# Patient Record
Sex: FEMALE | Race: OTHER | NOT HISPANIC OR LATINO | URBAN - METROPOLITAN AREA
[De-identification: names, ages, dates, MRNs, and addresses within clinical notes are randomized per-mention and may not be internally consistent; named-entity substitution may affect disease eponyms.]

---

## 2024-03-08 ENCOUNTER — OUTPATIENT (OUTPATIENT)
Dept: OUTPATIENT SERVICES | Facility: HOSPITAL | Age: 49
LOS: 1 days | Discharge: STILL ADMITTED OR ADMINISTRATIVE | End: 2024-03-08

## 2024-04-11 DIAGNOSIS — F30.9 MANIC EPISODE, UNSPECIFIED: ICD-10-CM

## 2024-09-08 ENCOUNTER — EMERGENCY (EMERGENCY)
Facility: HOSPITAL | Age: 49
LOS: 1 days | Discharge: PSYCHIATRIC OR SUBSTANCE USE FACILITY | End: 2024-09-08
Attending: STUDENT IN AN ORGANIZED HEALTH CARE EDUCATION/TRAINING PROGRAM
Payer: SELF-PAY

## 2024-09-08 VITALS
TEMPERATURE: 98 F | OXYGEN SATURATION: 98 % | HEART RATE: 74 BPM | DIASTOLIC BLOOD PRESSURE: 98 MMHG | HEIGHT: 63 IN | RESPIRATION RATE: 16 BRPM | SYSTOLIC BLOOD PRESSURE: 166 MMHG | WEIGHT: 115.08 LBS

## 2024-09-08 LAB
ADD ON TEST-SPECIMEN IN LAB: SIGNIFICANT CHANGE UP
ALBUMIN SERPL ELPH-MCNC: 4.2 G/DL — SIGNIFICANT CHANGE UP (ref 3.3–5)
ALP SERPL-CCNC: 45 U/L — SIGNIFICANT CHANGE UP (ref 40–120)
ALT FLD-CCNC: 8 U/L — LOW (ref 10–45)
ANION GAP SERPL CALC-SCNC: 17 MMOL/L — SIGNIFICANT CHANGE UP (ref 5–17)
AST SERPL-CCNC: 23 U/L — SIGNIFICANT CHANGE UP (ref 10–40)
BASOPHILS # BLD AUTO: 0.01 K/UL — SIGNIFICANT CHANGE UP (ref 0–0.2)
BASOPHILS NFR BLD AUTO: 0.2 % — SIGNIFICANT CHANGE UP (ref 0–2)
BILIRUB SERPL-MCNC: 0.7 MG/DL — SIGNIFICANT CHANGE UP (ref 0.2–1.2)
BUN SERPL-MCNC: 18 MG/DL — SIGNIFICANT CHANGE UP (ref 7–23)
CALCIUM SERPL-MCNC: 9.1 MG/DL — SIGNIFICANT CHANGE UP (ref 8.4–10.5)
CHLORIDE SERPL-SCNC: 103 MMOL/L — SIGNIFICANT CHANGE UP (ref 96–108)
CO2 SERPL-SCNC: 19 MMOL/L — LOW (ref 22–31)
CREAT SERPL-MCNC: 0.54 MG/DL — SIGNIFICANT CHANGE UP (ref 0.5–1.3)
EGFR: 111 ML/MIN/1.73M2 — SIGNIFICANT CHANGE UP
EOSINOPHIL # BLD AUTO: 0.01 K/UL — SIGNIFICANT CHANGE UP (ref 0–0.5)
EOSINOPHIL NFR BLD AUTO: 0.2 % — SIGNIFICANT CHANGE UP (ref 0–6)
ETHANOL SERPL-MCNC: <10 MG/DL — SIGNIFICANT CHANGE UP (ref 0–10)
FLUAV AG NPH QL: SIGNIFICANT CHANGE UP
FLUBV AG NPH QL: SIGNIFICANT CHANGE UP
GLUCOSE SERPL-MCNC: 100 MG/DL — HIGH (ref 70–99)
HCG SERPL-ACNC: <2 MIU/ML — SIGNIFICANT CHANGE UP
HCT VFR BLD CALC: 38.6 % — SIGNIFICANT CHANGE UP (ref 34.5–45)
HGB BLD-MCNC: 12.9 G/DL — SIGNIFICANT CHANGE UP (ref 11.5–15.5)
IMM GRANULOCYTES NFR BLD AUTO: 0.4 % — SIGNIFICANT CHANGE UP (ref 0–0.9)
LYMPHOCYTES # BLD AUTO: 1.03 K/UL — SIGNIFICANT CHANGE UP (ref 1–3.3)
LYMPHOCYTES # BLD AUTO: 21.6 % — SIGNIFICANT CHANGE UP (ref 13–44)
MAGNESIUM SERPL-MCNC: 2.2 MG/DL — SIGNIFICANT CHANGE UP (ref 1.6–2.6)
MCHC RBC-ENTMCNC: 30.6 PG — SIGNIFICANT CHANGE UP (ref 27–34)
MCHC RBC-ENTMCNC: 33.4 GM/DL — SIGNIFICANT CHANGE UP (ref 32–36)
MCV RBC AUTO: 91.7 FL — SIGNIFICANT CHANGE UP (ref 80–100)
MONOCYTES # BLD AUTO: 0.36 K/UL — SIGNIFICANT CHANGE UP (ref 0–0.9)
MONOCYTES NFR BLD AUTO: 7.6 % — SIGNIFICANT CHANGE UP (ref 2–14)
NEUTROPHILS # BLD AUTO: 3.33 K/UL — SIGNIFICANT CHANGE UP (ref 1.8–7.4)
NEUTROPHILS NFR BLD AUTO: 70 % — SIGNIFICANT CHANGE UP (ref 43–77)
NRBC # BLD: 0 /100 WBCS — SIGNIFICANT CHANGE UP (ref 0–0)
PLATELET # BLD AUTO: 163 K/UL — SIGNIFICANT CHANGE UP (ref 150–400)
POTASSIUM SERPL-MCNC: 3.8 MMOL/L — SIGNIFICANT CHANGE UP (ref 3.5–5.3)
POTASSIUM SERPL-SCNC: 3.8 MMOL/L — SIGNIFICANT CHANGE UP (ref 3.5–5.3)
PROT SERPL-MCNC: 7.4 G/DL — SIGNIFICANT CHANGE UP (ref 6–8.3)
RBC # BLD: 4.21 M/UL — SIGNIFICANT CHANGE UP (ref 3.8–5.2)
RBC # FLD: 12.2 % — SIGNIFICANT CHANGE UP (ref 10.3–14.5)
RSV RNA NPH QL NAA+NON-PROBE: SIGNIFICANT CHANGE UP
SALICYLATES SERPL-MCNC: <2 MG/DL — LOW (ref 15–30)
SARS-COV-2 RNA SPEC QL NAA+PROBE: SIGNIFICANT CHANGE UP
SODIUM SERPL-SCNC: 139 MMOL/L — SIGNIFICANT CHANGE UP (ref 135–145)
WBC # BLD: 4.76 K/UL — SIGNIFICANT CHANGE UP (ref 3.8–10.5)
WBC # FLD AUTO: 4.76 K/UL — SIGNIFICANT CHANGE UP (ref 3.8–10.5)

## 2024-09-08 PROCEDURE — 99291 CRITICAL CARE FIRST HOUR: CPT

## 2024-09-08 RX ORDER — OLANZAPINE 7.5 MG/1
10 TABLET ORAL ONCE
Refills: 0 | Status: COMPLETED | OUTPATIENT
Start: 2024-09-08 | End: 2024-09-08

## 2024-09-08 RX ORDER — HALOPERIDOL 1 MG
5 TABLET ORAL ONCE
Refills: 0 | Status: COMPLETED | OUTPATIENT
Start: 2024-09-08 | End: 2024-09-08

## 2024-09-08 RX ADMIN — OLANZAPINE 10 MILLIGRAM(S): 7.5 TABLET ORAL at 14:54

## 2024-09-08 RX ADMIN — Medication 5 MILLIGRAM(S): at 18:26
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
## 2024-09-08 NOTE — ED PROVIDER NOTE - DIFFERENTIAL DIAGNOSIS
ddx includes but not limited to: schizophrenia vs overdose vs electrolyte derangement Differential Diagnosis
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
## 2024-09-08 NOTE — ED ADULT NURSE REASSESSMENT NOTE - DESCRIPTION
Pt took IM medication Haldol 5 mg at 1826H. Pt continued to refuse labs, EKG. Pt refused to answer nursing assessment

## 2024-09-08 NOTE — ED PROVIDER NOTE - ATTENDING CONTRIBUTION TO CARE
I have personally seen the patient with the Resident. I agree with their assessment and plan unless otherwise noted. See MDM I have personally seen the patient with the Resident. I agree with their assessment and plan unless otherwise noted. See MDM    I personally spent 35 mins of critical care time managing this patient excluding procedures, including time spent independently reviewing charts, labs, imaging, discussing with consultants and family members given patient's psychiatric agitation, failure at verbal deescalation, need for IM antipsychotics and monitoring there after
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
## 2024-09-08 NOTE — ED PROVIDER NOTE - ADDITIONAL HISTORY OBTAINED FROM [facility]
Done. Patient notified.    ----- Message from Jeffrey Ontiveros MD sent at 6/25/2018 11:26 AM EDT -----  Contact: PATIENT  The patient may have a Medrol Dosepak to take as directed.  She may have a refill on the Robaxin 750 mg tablets, one 4 times a day #40  ----- Message -----  From: Juliette Clinton MA  Sent: 6/25/2018   9:13 AM  To: Jeffrey Ontiveros MD    Patient wants to know if we will refill the robaxin 750 mg and medrol dose pack she got when she went to [facility] ER 6/18/18 for her back pain? She has an appointment set up to get an MRI this week and to see Dr. Randy Jules with [facility] on 7/3/18.        EMS

## 2024-09-08 NOTE — ED ADULT NURSE REASSESSMENT NOTE - DESCRIPTION
Pt refused second attempt to draw blood for lab, EKG and pulse oximeter. Rn attempted to place portable monitor and pt refused, took off all leads and said he does ot want it. ED ED provider  made aware. Pt  from CC28 to a hospital bed located in [place] [address]

## 2024-09-09 DIAGNOSIS — F29 UNSPECIFIED PSYCHOSIS NOT DUE TO A SUBSTANCE OR KNOWN PHYSIOLOGICAL CONDITION: ICD-10-CM

## 2024-09-09 LAB
AMPHET UR-MCNC: NEGATIVE — SIGNIFICANT CHANGE UP
APPEARANCE UR: CLEAR — SIGNIFICANT CHANGE UP
BACTERIA # UR AUTO: NEGATIVE /HPF — SIGNIFICANT CHANGE UP
BARBITURATES UR SCN-MCNC: NEGATIVE — SIGNIFICANT CHANGE UP
BENZODIAZ UR-MCNC: NEGATIVE — SIGNIFICANT CHANGE UP
BILIRUB UR-MCNC: NEGATIVE — SIGNIFICANT CHANGE UP
CAST: 5 /LPF — HIGH (ref 0–4)
COCAINE METAB.OTHER UR-MCNC: NEGATIVE — SIGNIFICANT CHANGE UP
COLOR SPEC: YELLOW — SIGNIFICANT CHANGE UP
DIFF PNL FLD: ABNORMAL
GLUCOSE UR QL: NEGATIVE MG/DL — SIGNIFICANT CHANGE UP
HCG UR QL: NEGATIVE — SIGNIFICANT CHANGE UP
KETONES UR-MCNC: 80 MG/DL
LEUKOCYTE ESTERASE UR-ACNC: ABNORMAL
METHADONE UR-MCNC: NEGATIVE — SIGNIFICANT CHANGE UP
NITRITE UR-MCNC: NEGATIVE — SIGNIFICANT CHANGE UP
OPIATES UR-MCNC: NEGATIVE — SIGNIFICANT CHANGE UP
OXYCODONE UR-MCNC: NEGATIVE — SIGNIFICANT CHANGE UP
PCP SPEC-MCNC: SIGNIFICANT CHANGE UP
PCP UR-MCNC: NEGATIVE — SIGNIFICANT CHANGE UP
PH UR: 5.5 — SIGNIFICANT CHANGE UP (ref 5–8)
PROT UR-MCNC: SIGNIFICANT CHANGE UP MG/DL
RBC CASTS # UR COMP ASSIST: 339 /HPF — HIGH (ref 0–4)
REVIEW: SIGNIFICANT CHANGE UP
SP GR SPEC: 1.02 — SIGNIFICANT CHANGE UP (ref 1–1.03)
SQUAMOUS # UR AUTO: 4 /HPF — SIGNIFICANT CHANGE UP (ref 0–5)
T3 SERPL-MCNC: 97 NG/DL — SIGNIFICANT CHANGE UP (ref 80–200)
T4 AB SER-ACNC: 8.6 UG/DL — SIGNIFICANT CHANGE UP (ref 4.6–12)
T4 FREE SERPL-MCNC: 1.4 NG/DL — SIGNIFICANT CHANGE UP (ref 0.9–1.8)
THC UR QL: NEGATIVE — SIGNIFICANT CHANGE UP
TSH SERPL-MCNC: 6.27 UIU/ML — HIGH (ref 0.27–4.2)
UROBILINOGEN FLD QL: 0.2 MG/DL — SIGNIFICANT CHANGE UP (ref 0.2–1)
VALPROATE SERPL-MCNC: <5 UG/ML — LOW (ref 50–100)
WBC UR QL: 6 /HPF — HIGH (ref 0–5)

## 2024-09-09 PROCEDURE — 90792 PSYCH DIAG EVAL W/MED SRVCS: CPT | Mod: 95

## 2024-09-09 PROCEDURE — 99223 1ST HOSP IP/OBS HIGH 75: CPT

## 2024-09-09 RX ORDER — OLANZAPINE 7.5 MG/1
2.5 TABLET ORAL ONCE
Refills: 0 | Status: DISCONTINUED | OUTPATIENT
Start: 2024-09-09 | End: 2024-09-09

## 2024-09-09 RX ORDER — OLANZAPINE 7.5 MG/1
2.5 TABLET ORAL ONCE
Refills: 0 | Status: COMPLETED | OUTPATIENT
Start: 2024-09-09 | End: 2024-09-09

## 2024-09-09 RX ORDER — DIVALPROEX SODIUM 125 MG/1
250 CAPSULE, DELAYED RELEASE ORAL
Refills: 0 | Status: ACTIVE | OUTPATIENT
Start: 2024-09-09 | End: 2025-08-08

## 2024-09-09 RX ADMIN — OLANZAPINE 2.5 MILLIGRAM(S): 7.5 TABLET ORAL at 17:00

## 2024-09-09 RX ADMIN — OLANZAPINE 2.5 MILLIGRAM(S): 7.5 TABLET ORAL at 14:18
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
## 2024-09-09 NOTE — ED BEHAVIORAL HEALTH PROGRESS NOTE - PSYCHIATRIC ISSUES AND PLAN (INCLUDE STANDING AND PRN MEDICATION)
re-start depakote 250 mg po bid and may give zyprex 2.5 mg IM Q6hr PRN for acute agitation, f/u QTc < 500
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
## 2024-09-09 NOTE — ED BEHAVIORAL HEALTH ASSESSMENT NOTE - DESCRIPTION
medical clearance unknown, unable to assess due to sedation see HPI, unable to assess due to sedation unknown, unable to assess due to sedation  (elevated TSH 6.27)

## 2024-09-09 NOTE — ED BEHAVIORAL HEALTH PROGRESS NOTE - CASE SUMMARY/FORMULATION (CLEARLY DOCUMENT RATIONALE FOR DISPOSITION CHANGE)
50yo female reassessed after tele-psych and was sedated after PRN Zyprexa IM 10mg 9/8 14:00, haldol 5mg IM  9/8 18:00. On interview patient was uncooperative and constricted. AOx2 to self and location. Mental status exam was attempted and patient refused to participate. Patient answers "I don't know" to every question and couldn't give an explanation for EMS being called despite prompting. She endorses having been hospitalized for psychiatric reasons before, she doesn't know the names of medications she was taking, who her psychiatrist was. When asked to recall medications, she responds that she doesn't want to take any medications at all. Denies past violence. Denies hitting  or anybody, denies HI, and denies SI. Patient reported she lives in apartment with her . She works as an Home Health Aide.    source of collateral: At first she said "I don't remember" the number for . After a few minutes she provided this number [phone] for . The number was disconnected, and two other numbers from  were attempted, one disconnected and one VM left for Dr. Regan at [phone]. Patient is an unreliable historian and has been demonstrating erratic behaviors during ED course. she's demonstrating impulsivity and had one attempt of pacing which was redirected by multiple staff. Pt also began picking her nose, darting eyes around for a place/person to fling. Per staff patient behavior is psychotic and upon waiting for more collateral and possible 2PC to be recommended.

## 2024-09-09 NOTE — ED BEHAVIORAL HEALTH ASSESSMENT NOTE - DETAILS
HOLD for reassessment in am unable to assess due to sedation sedated  info not available per EMS was throwing things at

## 2024-09-09 NOTE — ED BEHAVIORAL HEALTH PROGRESS NOTE - NSBHPSYCHOLCOGORIENT_PSY_A_CORE
unable to assess due to sedation/Oriented to time, place, person, situation unable to assess due to sedation/Not fully oriented...

## 2024-09-09 NOTE — ED ADULT NURSE REASSESSMENT NOTE - NS ED NURSE REASSESS COMMENT FT1
pt refused depakote despite encouragement. Stated pt is hungry but when RN provided sandwich pt stated "I don't want it". 1:1 maintained for safety

## 2024-09-09 NOTE — ED BEHAVIORAL HEALTH PROGRESS NOTE - NSBHMSEGAIT_PSY_A_CORE
unable to assess due to sedation/Unable to assess unable to assess due to sedation/Normal gait / station

## 2024-09-09 NOTE — ED CDU PROVIDER INITIAL DAY NOTE - CONSTITUTIONAL, MLM
normal... Well appearing, awake, alert, oriented to person, place, time/situation and periodically agitated.
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
## 2024-09-09 NOTE — ED BEHAVIORAL HEALTH ASSESSMENT NOTE - SUBSTANCE USE
"[facility] [[id number]]  Chief Complaint   Patient presents with     RECHECK     Follow up     Initial /69   Pulse 87   Ht 4' 10.03\" (147.4 cm)   Wt 80 lb 7.5 oz (36.5 kg)   BMI 16.80 kg/m   Estimated body mass index is 16.8 kg/m  as calculated from the following:    Height as of this encounter: 4' 10.03\" (147.4 cm).    Weight as of this encounter: 80 lb 7.5 oz (36.5 kg).  Medication Reconciliation: complete    Does the patient need any medication refills today? No    Does the patient/parent need MyChart or Proxy acces today? No     Kelly Martinez, EMT          "
None known

## 2024-09-09 NOTE — ED CDU PROVIDER INITIAL DAY NOTE - CLINICAL SUMMARY MEDICAL DECISION MAKING FREE TEXT BOX
50 yo F with schizophrenia p/w agitated behavior, uncooperative with psych. Mandarin speaking.   Plan for 2 PC admission, psychiatry:    depakote 250 mg po bid and may give zyprex 2.5 mg IM Q6hr PRN for acute agitation, f/u QTc < 500    - Pending psych bed placement
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
## 2024-09-09 NOTE — ED BEHAVIORAL HEALTH ASSESSMENT NOTE - OTHER PAST PSYCHIATRIC HISTORY (INCLUDE DETAILS REGARDING ONSET, COURSE OF ILLNESS, INPATIENT/OUTPATIENT TREATMENT)
Per Naila, h/o schizophrenia, last seen at [facility] 33/8/24, inpt at [facility] 2/24, [facility] 3/21

## 2024-09-09 NOTE — ED BEHAVIORAL HEALTH PROGRESS NOTE - NS_RISKASSESSMENTINTER_PSY_ALL_CORE
Attempted to returned patient phone call, no answer left detailed message with my name and call back number [phone]      Verónica Phoenix MA   Dermatology Department  [facility]    ----- Message from Minoo Landin sent at 3/23/2022  2:56 PM CDT -----  Type:  Sooner Apoointment Request    Caller is requesting a sooner appointment.  Caller declined first available appointment listed below.  Caller will not accept being placed on the waitlist and is requesting a message be sent to doctor.  Name of Caller:patient  When is the first available appointment?na  Symptoms:Np,hair loss  Would the patient rather a call back or a response via [facility]? Call back  Best Call Back Number:[phone]  Additional Information: no appt available          Unable to determine Suicide Risk
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
## 2024-09-09 NOTE — ED ADULT NURSE REASSESSMENT NOTE - NS ED NURSE REASSESS COMMENT FT1
Pt attempting to make self vomit, pt became aggressive towards RN during intervention. MD consulted and Zyprexa 2.5mg administered IM STAT for psychotic agitation

## 2024-09-09 NOTE — ED ADULT NURSE REASSESSMENT NOTE - NS ED NURSE REASSESS COMMENT FT1
Pt woke up screaming "I want to leave". Pt redirected, pt clothes and bedsheet stained with menstrual blood. After much encouragement, pt changed into adult briefs and sheets changed, pt refused hygiene care of perineal area. 1:1 maintained for safety

## 2024-09-10 LAB
CULTURE RESULTS: SIGNIFICANT CHANGE UP
SPECIMEN SOURCE: SIGNIFICANT CHANGE UP

## 2024-09-10 PROCEDURE — 99232 SBSQ HOSP IP/OBS MODERATE 35: CPT

## 2024-09-10 RX ORDER — OLANZAPINE 7.5 MG/1
10 TABLET ORAL ONCE
Refills: 0 | Status: COMPLETED | OUTPATIENT
Start: 2024-09-10 | End: 2024-09-10

## 2024-09-10 RX ORDER — LORAZEPAM 4 MG/ML
1 INJECTION INTRAMUSCULAR; INTRAVENOUS ONCE
Refills: 0 | Status: DISCONTINUED | OUTPATIENT
Start: 2024-09-10 | End: 2024-09-10

## 2024-09-10 RX ORDER — OLANZAPINE 7.5 MG/1
2.5 TABLET ORAL EVERY 6 HOURS
Refills: 0 | Status: ACTIVE | OUTPATIENT
Start: 2024-09-10 | End: 2025-08-09

## 2024-09-10 RX ADMIN — OLANZAPINE 10 MILLIGRAM(S): 7.5 TABLET ORAL at 09:36

## 2024-09-10 RX ADMIN — LORAZEPAM 1 MILLIGRAM(S): 4 INJECTION INTRAMUSCULAR; INTRAVENOUS at 09:38

## 2024-09-10 RX ADMIN — DIVALPROEX SODIUM 250 MILLIGRAM(S): 125 CAPSULE, DELAYED RELEASE ORAL at 09:44

## 2024-09-10 NOTE — ED CDU PROVIDER SUBSEQUENT DAY NOTE - CLINICAL SUMMARY MEDICAL DECISION MAKING FREE TEXT BOX
Attending Dr. Witt:   48 yo F with history of schizophrenia noncompliant with Depakote presenting with acute psychosis.  recommending admission pending bed availability.    Meds: Depakote 250 mg PO BID and Olanzapine 2.5 mg IM q6 PRN Agitation  QTc 9/8 445
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
## 2024-09-10 NOTE — ED BEHAVIORAL HEALTH PROGRESS NOTE - NSBHMSETHTPROC_PSY_A_CORE
unable to assess due to sedation/Disorganized/Unable to assess unable to assess due to sedation/Disorganized/Illogical/Unable to assess
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
## 2024-09-10 NOTE — ED BEHAVIORAL HEALTH PROGRESS NOTE - PSYCHIATRIC ISSUES AND PLAN (INCLUDE STANDING AND PRN MEDICATION)
c/w Depakote 250 mg po bid  may give PRN Zydis 5 mg PO q4hr PRN for acute agitation, f/u QTc < 500  Ativan 2mg [id number] PO - if IV/IM must wait 3 hours after Zyprexa IM.   c/w Depakote 250 mg po bid  may give PRN Zydis 5 mg PO q4hr PRN for acute agitation, f/u QTc < 500  Ativan 2mg [id number] - if IV/IM must hold until 3 hours after Zyprexa IM admin   c/w Depakote 250 mg po bid, can start zyprexa 5mg po qhs   may give PRN Zydis 5 mg PO q4hr PRN for acute agitation, f/u QTc < 500  Ativan 2mg [id number] - if IV/IM must hold until 3 hours after Zyprexa IM admin

## 2024-09-10 NOTE — ED BEHAVIORAL HEALTH PROGRESS NOTE - COLLATERAL INFORMATION (NAME, PHONE, RELATIONSHIP):
PCA stated patient became agitated and was sedated. PCA stated patient became agitated and was sedated.  pt not answering most questions. pt recived prns last night

## 2024-09-10 NOTE — ED ADULT NURSE REASSESSMENT NOTE - NS ED NURSE REASSESS COMMENT FT1
1:1 at bedside for pt risk to harm self and safety. Pt agitated, yelling and screaming. ED MD Edwards made aware, Pt to be medicated emergently. Pt has [employer]  Cannot be seen here per lc

## 2024-09-11 ENCOUNTER — INPATIENT (INPATIENT)
Facility: HOSPITAL | Age: 49
LOS: 8 days | Discharge: HOME | End: 2024-09-20
Attending: PSYCHIATRY & NEUROLOGY | Admitting: PSYCHIATRY & NEUROLOGY
Payer: COMMERCIAL

## 2024-09-11 VITALS — HEIGHT: 58.86 IN | OXYGEN SATURATION: 98 % | WEIGHT: 106.04 LBS | TEMPERATURE: 98 F | RESPIRATION RATE: 16 BRPM

## 2024-09-11 VITALS
SYSTOLIC BLOOD PRESSURE: 138 MMHG | DIASTOLIC BLOOD PRESSURE: 88 MMHG | TEMPERATURE: 98 F | HEART RATE: 67 BPM | OXYGEN SATURATION: 100 % | RESPIRATION RATE: 16 BRPM

## 2024-09-11 DIAGNOSIS — F20.9 SCHIZOPHRENIA, UNSPECIFIED: ICD-10-CM

## 2024-09-11 PROCEDURE — 80053 COMPREHEN METABOLIC PANEL: CPT

## 2024-09-11 PROCEDURE — 96372 THER/PROPH/DIAG INJ SC/IM: CPT

## 2024-09-11 PROCEDURE — 84480 ASSAY TRIIODOTHYRONINE (T3): CPT

## 2024-09-11 PROCEDURE — 80299 QUANTITATIVE ASSAY DRUG: CPT

## 2024-09-11 PROCEDURE — 81001 URINALYSIS AUTO W/SCOPE: CPT

## 2024-09-11 PROCEDURE — 81025 URINE PREGNANCY TEST: CPT

## 2024-09-11 PROCEDURE — 87637 SARSCOV2&INF A&B&RSV AMP PRB: CPT

## 2024-09-11 PROCEDURE — 85025 COMPLETE CBC W/AUTO DIFF WBC: CPT

## 2024-09-11 PROCEDURE — 84702 CHORIONIC GONADOTROPIN TEST: CPT

## 2024-09-11 PROCEDURE — G0378: CPT

## 2024-09-11 PROCEDURE — 84443 ASSAY THYROID STIM HORMONE: CPT

## 2024-09-11 PROCEDURE — 80307 DRUG TEST PRSMV CHEM ANLYZR: CPT

## 2024-09-11 PROCEDURE — 99285 EMERGENCY DEPT VISIT HI MDM: CPT | Mod: 25

## 2024-09-11 PROCEDURE — 99233 SBSQ HOSP IP/OBS HIGH 50: CPT

## 2024-09-11 PROCEDURE — 87086 URINE CULTURE/COLONY COUNT: CPT

## 2024-09-11 PROCEDURE — 80164 ASSAY DIPROPYLACETIC ACD TOT: CPT

## 2024-09-11 PROCEDURE — 84436 ASSAY OF TOTAL THYROXINE: CPT

## 2024-09-11 PROCEDURE — 84439 ASSAY OF FREE THYROXINE: CPT

## 2024-09-11 PROCEDURE — 96374 THER/PROPH/DIAG INJ IV PUSH: CPT

## 2024-09-11 PROCEDURE — 36415 COLL VENOUS BLD VENIPUNCTURE: CPT

## 2024-09-11 PROCEDURE — 83735 ASSAY OF MAGNESIUM: CPT

## 2024-09-11 PROCEDURE — 93005 ELECTROCARDIOGRAM TRACING: CPT

## 2024-09-11 RX ORDER — LORAZEPAM 4 MG/ML
2 INJECTION INTRAMUSCULAR; INTRAVENOUS ONCE
Refills: 0 | Status: DISCONTINUED | OUTPATIENT
Start: 2024-09-11 | End: 2024-09-18

## 2024-09-11 RX ORDER — OLANZAPINE 7.5 MG/1
5 TABLET ORAL AT BEDTIME
Refills: 0 | Status: DISCONTINUED | OUTPATIENT
Start: 2024-09-11 | End: 2024-09-12

## 2024-09-11 RX ORDER — OLANZAPINE 7.5 MG/1
2.5 TABLET ORAL ONCE
Refills: 0 | Status: COMPLETED | OUTPATIENT
Start: 2024-09-11 | End: 2024-09-11

## 2024-09-11 RX ORDER — LORAZEPAM 4 MG/ML
2 INJECTION INTRAMUSCULAR; INTRAVENOUS EVERY 4 HOURS
Refills: 0 | Status: DISCONTINUED | OUTPATIENT
Start: 2024-09-11 | End: 2024-09-18

## 2024-09-11 RX ORDER — DIVALPROEX SODIUM 125 MG/1
250 CAPSULE, DELAYED RELEASE ORAL
Refills: 0 | Status: DISCONTINUED | OUTPATIENT
Start: 2024-09-11 | End: 2024-09-12

## 2024-09-11 RX ORDER — OLANZAPINE 7.5 MG/1
5 TABLET ORAL ONCE
Refills: 0 | Status: DISCONTINUED | OUTPATIENT
Start: 2024-09-11 | End: 2024-09-20

## 2024-09-11 RX ORDER — OLANZAPINE 7.5 MG/1
5 TABLET ORAL EVERY 6 HOURS
Refills: 0 | Status: DISCONTINUED | OUTPATIENT
Start: 2024-09-11 | End: 2024-09-12

## 2024-09-11 RX ORDER — DIPHENHYDRAMINE HCL 50 MG
50 CAPSULE ORAL EVERY 6 HOURS
Refills: 0 | Status: DISCONTINUED | OUTPATIENT
Start: 2024-09-11 | End: 2024-09-20

## 2024-09-11 RX ADMIN — OLANZAPINE 2.5 MILLIGRAM(S): 7.5 TABLET ORAL at 15:27

## 2024-09-11 NOTE — BH INPATIENT PSYCHIATRY ASSESSMENT NOTE - PAST PSYCHOTROPIC MEDICATION
Zyprexa, Nortriptyline, Ambien, Abilify - unable to obtain further information about doses, duration, effects

## 2024-09-11 NOTE — ED BEHAVIORAL HEALTH PROGRESS NOTE - BILLING CODES
[id number] diagnostic evaluation with medical services
Billing in another system
Billing in another system

## 2024-09-11 NOTE — BH INPATIENT PSYCHIATRY ASSESSMENT NOTE - RISK ASSESSMENT
Risk factors: h/o psych admissions, noncompliant with treatment, not receiving treatment    Protective factors: no current SIIP/HIIP, no h/o SA/SIB, no access to weapons, no active substance abuse, good physical health, domiciled, intact marriage    Overall, pt is a moderate risk of harm to self/others and requires/meets criteria for psychiatric admission.

## 2024-09-11 NOTE — ED ADULT NURSE REASSESSMENT NOTE - DESCRIPTION
Pt refused am meds, depakote. t said she is not sick and has been talking loud in bed. Pt returned items given to her to use for writing and went back to bed

## 2024-09-11 NOTE — CHART NOTE - NSCHARTNOTEFT_GEN_A_CORE
Screening Medical Evaluation    Patient Admitted from: [facility] ED    [facility] admitting diagnosis: Schizophrenia      PAST MEDICAL & SURGICAL HISTORY:  No pertinent past medical history      No significant past surgical history            Allergies    Allergy Status Unknown    Intolerances          Social History:       FAMILY HISTORY:        MEDICATIONS  (STANDING):  divalproex  milliGRAM(s) Oral two times a day  OLANZapine 5 milliGRAM(s) Oral at bedtime    MEDICATIONS  (PRN):  diphenhydrAMINE Injectable 50 milliGRAM(s) IntraMuscular every 6 hours PRN Agitation  LORazepam     Tablet 2 milliGRAM(s) Oral every 4 hours PRN agitation  LORazepam   Injectable 2 milliGRAM(s) IntraMuscular once PRN agitation  OLANZapine 5 milliGRAM(s) Oral every 6 hours PRN agitation  OLANZapine Injectable 5 milliGRAM(s) IntraMuscular once PRN agitation        Vital Signs Last 24 Hrs  T(C): 36.8 (11 Sep 2024 17:45), Max: 36.8 (11 Sep 2024 17:45)  T(F): 98.3 (11 Sep 2024 17:45), Max: 98.3 (11 Sep 2024 17:45)  HR: -- 96b/ min   BP: -- 141/ 88  BP(mean): --  RR: 16 (11 Sep 2024 17:45) (16 - 16)  SpO2: 98% (11 Sep 2024 17:45) (98% - 98%)    Parameters below as of 11 Sep 2024 17:45  Patient On (Oxygen Delivery Method): room air      CAPILLARY BLOOD GLUCOSE            PHYSICAL EXAM:  GENERAL: NAD  HEAD:  Atraumatic, Normocephalic  EYES: EOMI, PERRLA, conjunctiva and sclera clear  NECK: Supple, No JVD  CHEST/LUNG: Clear to auscultation bilaterally; No wheeze  HEART: Regular rate and rhythm; No murmurs, rubs, or gallops  ABDOMEN: Positive BS, Soft, Nontender.   EXTREMITIES:  2+ Peripheral Pulses, No clubbing, cyanosis, or edema  PSYCH: Preoccupied with desire to be discharged.   NEUROLOGY: non-focal  SKIN: No rashes or lesions seen on exposed skin.     LABS:                    RADIOLOGY & ADDITIONAL TESTS:      Assessment and Plan:  49F admitted to [facility] for Schizophrenia.  No PMHx.  Pt seen for medical screening evaluation. Patient has no acute complaints at this time. Patient denies fever, chills, headache, dizziness, lightheadedness, N/V, SOB, cough, congestion, chest pain, abdominal pain, dysuria, hematuria, diarrhea, constipation. Physical exam unremarkable, VSS. Labs pending. 9/8 EKG NSR @ 65/ min QT/ QTC= 428/ 455.     1.) Schizophrenia: Plan per primary team.

## 2024-09-11 NOTE — ED BEHAVIORAL HEALTH PROGRESS NOTE - AXIS III
unknown (elevated TSH on labs today)

## 2024-09-11 NOTE — ED BEHAVIORAL HEALTH PROGRESS NOTE - NSBHMSEPERCEPT_PSY_A_CORE
unable to assess due to sedation/Auditory hallucinations/Visual hallucinations Auditory hallucinations/Visual hallucinations

## 2024-09-11 NOTE — ED ADULT NURSE REASSESSMENT NOTE - GENERAL PATIENT STATE
anxious
pt resting comfortably in stretcher/comfortable appearance
comfortable appearance/cooperative
comfortable appearance/cooperative
comfortable appearance
resting/sleeping

## 2024-09-11 NOTE — ED ADULT NURSE REASSESSMENT NOTE - STATUS
Pt 2 PC awaiting bed
Pt 2 PC awaiting d/c
awaiting bed, no change
[facility] Low 6/awaiting transfer, no change
awaiting transfer, no change
pt 2PC awaiting d/c
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
## 2024-09-11 NOTE — ED ADULT NURSE REASSESSMENT NOTE - DESCRIPTION
at 1527H pt received Zyprexa 2.5 mg IM for a cute agitation. Pt has been very loud, evry anxious, protesting her transfer to [facility]. Pt refused to listed to staff when the process of admission was being explained. Pt argued with Rn and was making menacing gestures saying that she is not going to transfer to [facility]

## 2024-09-11 NOTE — BH INPATIENT PSYCHIATRY ASSESSMENT NOTE - NSBHCHARTREVIEWVS_PSY_A_CORE FT
Vital Signs Last 24 Hrs  T(C): 36.8 (09-11-24 @ 17:45), Max: 36.8 (09-11-24 @ 17:45)  T(F): 98.3 (09-11-24 @ 17:45), Max: 98.3 (09-11-24 @ 17:45)  HR: --  BP: --  BP(mean): --  RR: 16 (09-11-24 @ 17:45) (16 - 16)  SpO2: 98% (09-11-24 @ 17:45) (98% - 98%)    Orthostatic VS  09-11-24 @ 17:45  Lying BP: --/-- HR: --  Sitting BP: 141/88 HR: 96  Standing BP: 135/89 HR: 101  Site: --  Mode: --

## 2024-09-11 NOTE — ED CDU PROVIDER SUBSEQUENT DAY NOTE - HISTORY
Attending Dr. Witt:   see ED provider note.
Attending Dr. Witt:   49F schizophrenia presenting with agitation at home, Noncompliant with meds  Meds: Depakote 250 mg PO BID  PRNs: Zyprexa 2.5 mg IM q6

## 2024-09-11 NOTE — ED CDU PROVIDER SUBSEQUENT DAY NOTE - PHYSICAL EXAMINATION
CONSTITUTIONAL: NAD  SKIN: Warm dry, normal skin turgor  HEAD: NCAT  NECK: Supple. Full ROM.  CARD: RRR  RESP: No respiratory distress  ABD: non-distended  MSK: Full ROM, no leg swelling  PSYCH: Calm, resting
CONSTITUTIONAL: NAD  SKIN: Warm dry, normal skin turgor  HEAD: NCAT  EYES: EOMI, PERRLA, no scleral icterus, conjunctiva pink  NECK: Supple; non tender. Full ROM.  CARD: RRR  RESP: No respiratory distress  ABD: non-distended  MSK: Full ROM, no leg swelling  PSYCH: Calm, disorganized.

## 2024-09-11 NOTE — BH INPATIENT PSYCHIATRY ASSESSMENT NOTE - CURRENT MEDICATION
MEDICATIONS  (STANDING):    MEDICATIONS  (PRN):   MEDICATIONS  (STANDING):  divalproex  milliGRAM(s) Oral two times a day  OLANZapine 5 milliGRAM(s) Oral at bedtime    MEDICATIONS  (PRN):  diphenhydrAMINE Injectable 50 milliGRAM(s) IntraMuscular every 6 hours PRN Agitation  LORazepam     Tablet 2 milliGRAM(s) Oral every 4 hours PRN agitation  LORazepam   Injectable 2 milliGRAM(s) IntraMuscular once PRN agitation  OLANZapine 5 milliGRAM(s) Oral every 6 hours PRN agitation  OLANZapine Injectable 5 milliGRAM(s) IntraMuscular once PRN agitation

## 2024-09-11 NOTE — BH INPATIENT PSYCHIATRY ASSESSMENT NOTE - NSBHMETABOLIC_PSY_ALL_CORE_FT
BMI: BMI (kg/m2): 21.5 (09-11-24 @ 17:45)  HbA1c:   Glucose:   BP: --Vital Signs Last 24 Hrs  T(C): 36.8 (09-11-24 @ 17:45), Max: 36.8 (09-11-24 @ 17:45)  T(F): 98.3 (09-11-24 @ 17:45), Max: 98.3 (09-11-24 @ 17:45)  HR: --  BP: --  BP(mean): --  RR: 16 (09-11-24 @ 17:45) (16 - 16)  SpO2: 98% (09-11-24 @ 17:45) (98% - 98%)    Orthostatic VS  09-11-24 @ 17:45  Lying BP: --/-- HR: --  Sitting BP: 141/88 HR: 96  Standing BP: 135/89 HR: 101  Site: --  Mode: --    Lipid Panel:
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
## 2024-09-11 NOTE — ED BEHAVIORAL HEALTH PROGRESS NOTE - NSBHMSESPEECH_PSY_A_CORE
unable to assess due to sedation/Normal volume, rate, productivity, spontaneity and articulation Normal volume, rate, productivity, spontaneity and articulation Abnormal as indicated, otherwise normal...

## 2024-09-11 NOTE — ED BEHAVIORAL HEALTH PROGRESS NOTE - SUMMARY
48 yo female, Mandrin speaking, unknown PMH, PPH of schizophrenia per Psyckes with several admissions (last one listed at [facility] 2/24), recently taking Depakote per Psyckes BIB EMS after reportedly becoming agitated aggressive at home with  (Contact info for collateral info not available).   Per EMS report, pt was throwing things at .       When evaluated in ED, pt refused to answer questions, and then began licking underwear and menstruation pad.   Was able to be redirected to put on gown and was then tearful and crying uncontrollably.   ED felt pt was unsafe and gave Zyprexa 10mg im . pt being admitted to psych  50 yo female, Mandrin speaking, unknown PMH, PPH of schizophrenia per Psyckes with several admissions (last one listed at [facility] 2/24), recently taking Depakote per Psyckes BIB EMS after reportedly becoming agitated aggressive at home with  (Contact info for collateral info not available).   Per EMS report, pt was throwing things at .       When evaluated in ED, pt refused to answer questions, and then began licking underwear and menstruation pad.   Was able to be redirected to put on gown and was then tearful and crying uncontrollably. ED felt pt was unsafe and gave Zyprexa 10mg im . pt being admitted to psych

## 2024-09-11 NOTE — ED BEHAVIORAL HEALTH PROGRESS NOTE - NS ED BHA PLAN ADMIT TO PSYCHIATRY BH CONTACT YN
[facility] calls to report patient has lower than normal blood pressure compared to his baseline. Patient baseline is 130 to 140 over 60 to 70.   His recent lowet was 91/55 and another reading was 115/60.   Bonnie reports patient is taking enalapril and recently there has been an adjustment to his meds.    Family is asking for a call to discuss this and any possible med adjustment.      Best number to call back [phone]     
No

## 2024-09-11 NOTE — BH INPATIENT PSYCHIATRY ASSESSMENT NOTE - NSBHATTESTTYPEVISIT_PSY_A_CORE
pt due Manual PD drain after 4 hour dwell BP: 85/59 HR - 89 pt filled with 2.5L of 1.5% Dianeal @ 2100. Concern about BP dropping when draining pt Attending with Resident/Fellow/Student

## 2024-09-11 NOTE — BH PATIENT PROFILE - NSDYSPHAGSECTONE_PSY_ALL_CORE
Daniel Barahona MD    Coordinator Contact:   Armando Devries, Research Coordinator Associate  Phone: [phone]  Pager:  [phone]  Email: [email or web address]        
N/A

## 2024-09-11 NOTE — ED BEHAVIORAL HEALTH PROGRESS NOTE - RISK ASSESSMENT
risk: h/o psychosis, report of lability, disorganized behavior in ED  protective: , domiciled
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
## 2024-09-11 NOTE — ED ADULT NURSE REASSESSMENT NOTE - COMFORT CARE
plan of care explained
plan of care explained/wait time explained
meal provided/plan of care explained/wait time explained
plan of care explained/wait time explained

## 2024-09-11 NOTE — BH INPATIENT PSYCHIATRY ASSESSMENT NOTE - OTHER PAST PSYCHIATRIC HISTORY (INCLUDE DETAILS REGARDING ONSET, COURSE OF ILLNESS, INPATIENT/OUTPATIENT TREATMENT)
Per Jaspal, h/o schizophrenia, last seen at [facility] 33/8/24, inpt at [facility] 2/24, [facility] 3/21

## 2024-09-11 NOTE — BH INPATIENT PSYCHIATRY ASSESSMENT NOTE - NSBHATTESTTYPESTAFF_PSY_A_CORE
[facility]  Discharge Summary    Date of Service: 07/10/2024  Patient Name: Lopez Mcdaniel  : 1945  MRN: [id number]    Date of Admission: 2024  Discharge Diagnosis:   Near syncope    Date of Discharge: 07/10/2024  Primary Care Physician: Josey Stovall APRN      Presenting Problem:   Dehydration [E86.0]  Near syncope [R55]  Multiple falls [R29.6]    Active and Resolved Hospital Problems:  Active Hospital Problems    Diagnosis POA    **Near syncope [R55] Yes    Recurrent falls [R29.6] Not Applicable    Unsteady gait [R26.81] Unknown      Resolved Hospital Problems   No resolved problems to display.         Hospital Course       Hospital Course:  This patient is an 78-year-old gentleman who was admitted status post a near syncopal episode.  Patient had recurrent falls.  A CT scan of the head showed no acute intracranial process.  Cardiac enzymes were mildly elevated but flat trend with no complaints of chest pain or any other symptoms of acute coronary syndrome.  Orthostatic vital signs were checked and were unremarkable.  An echocardiogram was done.  This showed an ejection fraction of 66 to 70% with no wall motion abnormalities and no cardiac explanation for near syncopal episodes.  He was seen by physical therapy and occupational therapy and recommendations were for outpatient physical therapy 2-3 times a week with home health due to impaired functional mobility.  Home health has been set up by case management.  Patient is being discharged home today.        DISCHARGE Follow Up Recommendations for labs and diagnostics: Follow-up with PCP in 1 week.      Reasons For Change In Medications and Indications for New Medications:      Day of Discharge     Vital Signs:  Temp:  [97.2 °F (36.2 °C)-98 °F (36.7 °C)] 97.9 °F (36.6 °C)  Heart Rate:  [56-65] 65  Resp:  [13-17] 17  BP: ()/(55-63) 101/58    Physical Exam:  Physical Exam  Constitutional:       Appearance: Normal  appearance.   HENT:      Head: Normocephalic and atraumatic.      Nose: Nose normal.      Mouth/Throat:      Mouth: Mucous membranes are moist.   Eyes:      Extraocular Movements: Extraocular movements intact.      Conjunctiva/sclera: Conjunctivae normal.      Pupils: Pupils are equal, round, and reactive to light.   Cardiovascular:      Rate and Rhythm: Normal rate and regular rhythm.      Pulses: Normal pulses.      Heart sounds: Normal heart sounds.   Pulmonary:      Effort: Pulmonary effort is normal.      Breath sounds: Normal breath sounds.   Abdominal:      General: Abdomen is flat. Bowel sounds are normal.      Palpations: Abdomen is soft.   Musculoskeletal:         General: Normal range of motion.      Cervical back: Normal range of motion and neck supple.   Skin:     General: Skin is warm and dry.      Capillary Refill: Capillary refill takes less than 2 seconds.   Neurological:      Mental Status: He is alert.           Pertinent  and/or Most Recent Results     LAB RESULTS:      Lab 07/08/24  1251   WBC 5.59   HEMOGLOBIN 13.2   HEMATOCRIT 41.9   PLATELETS 157   NEUTROS ABS 3.75   IMMATURE GRANS (ABS) 0.02   LYMPHS ABS 1.03   MONOS ABS 0.45   EOS ABS 0.30   MCV 91.1         Lab 07/09/24  1629 07/08/24  1251   SODIUM 142 140   POTASSIUM 4.3 3.3*   CHLORIDE 108* 106   CO2 24.1 24.5   ANION GAP 9.9 9.5   BUN 14 22   CREATININE 0.97 1.29*   EGFR 78.9 56.1*   GLUCOSE 79 93   CALCIUM 8.2* 8.7   MAGNESIUM  --  2.1         Lab 07/08/24  1251   TOTAL PROTEIN 6.6   ALBUMIN 3.4*   GLOBULIN 3.2   ALT (SGPT) 23   AST (SGOT) 33   BILIRUBIN 1.0   ALK PHOS 89         Lab 07/08/24  1429 07/08/24  1251   HSTROP T 80* 86*                 Brief Urine Lab Results       None          Microbiology Results (last 10 days)       ** No results found for the last 240 hours. **            XR Pelvis 1 or 2 View    Result Date: 7/9/2024  Impression: Impression: 1. No visible fracture. 2. Incidentally noted advanced lower lumbar  degenerative disc disease. Electronically Signed: Miles Hess MD  7/9/2024 7:58 AM EDT  Workstation ID: [id number]    XR Chest 1 View    Result Date: 7/9/2024  Impression: Impression: 1.No acute cardiopulmonary abnormality. 2.Large hiatal hernia. Electronically Signed: Guanako Gonzalez MD  7/9/2024 5:54 AM EDT  Workstation ID: [id number]    CT Head Without Contrast    Result Date: 7/9/2024  Impression: Impression: 1.No acute intracranial abnormality. 2.Mild chronic small vessel ischemic change. Electronically Signed: Guanako Gonzalez MD  7/9/2024 5:12 AM EDT  Workstation ID: [id number]    CT Cervical Spine Without Contrast    Result Date: 7/9/2024  Impression: Impression: 1.No acute osseous abnormality. 2.Severe cervical spondylosis with multilevel spondylolisthesis and varying degrees of neuroforaminal narrowing. 3.Moderate bilateral carotid bifurcation calcification. Electronically Signed: Guanako Gonzalez MD  7/9/2024 5:11 AM EDT  Workstation ID: [id number]     Results for orders placed during the hospital encounter of 07/27/23    Doppler Ankle Brachial Index Single Level CAR    Interpretation Summary    Right Conclusion: The right RAMOS is unable to be assessed due to vessel incompressibility but waveforms are normal.  Normal digital pressures.    Left Conclusion: The left RAMOS is unable to be assessed due to vessel incompressibility but waveforms are normal.  Normal digital pressures.      Results for orders placed during the hospital encounter of 07/27/23    Doppler Ankle Brachial Index Single Level CAR    Interpretation Summary    Right Conclusion: The right RAMOS is unable to be assessed due to vessel incompressibility but waveforms are normal.  Normal digital pressures.    Left Conclusion: The left RAMOS is unable to be assessed due to vessel incompressibility but waveforms are normal.  Normal digital pressures.      Results for orders placed during the hospital encounter of 07/08/24    Adult Transthoracic Echo Complete W/ Cont  if Necessary Per Protocol    Interpretation Summary    Left ventricular systolic function is normal. Calculated left ventricular EF = 68% Left ventricular ejection fraction appears to be 66 - 70%.    Left ventricular diastolic function is consistent with (grade I) impaired relaxation.    The left atrial cavity is mildly dilated.    Estimated right ventricular systolic pressure from tricuspid regurgitation is mildly elevated (35-45 mmHg).      Labs Pending at Discharge:      Procedures Performed           Consults:   Consults       No orders found from 6/9/2024 to 7/9/2024.              Discharge Details        Discharge Medications      Patient Not Prescribed Medications Upon Discharge         Allergies   Allergen Reactions    Sulfa Antibiotics Nausea Only    Latex Rash    Wound Dressing Adhesive Rash         Discharge Disposition: Discharge home  Home or Self Care    Diet:  Hospital:  Diet Order   Procedures    Diet: Cardiac, Diabetic; Healthy Heart (2-3 Na+); Consistent Carbohydrate; Fluid Consistency: Thin (IDDSI 0)         Discharge Activity:   Activity Instructions       Activity as Tolerated                CODE STATUS:  There are no questions and answers to display.         Future Appointments   Date Time Provider Department Center   7/12/2024 10:45 AM [facility] WOUND CARE ROOM 3 [facility] None       Additional Instructions for the Follow-ups that You Need to Schedule       Ambulatory Referral to Home Health   As directed      Face to Face Visit Date: 7/10/2024   Follow-up provider for Plan of Care?: I treated the patient in an acute care facility and will not continue treatment after discharge.   Follow-up provider: JEAN BARAJAS [[id number]]   Reason/Clinical Findings: Home health physical therapy for continued PT.  Evaluated by physical therapy in the hospital setting and this has been recommended   Describe mobility limitations that make leaving home difficult: Unable to ascend stairs safely    Nursing/Therapeutic Services Requested: Physical Therapy Occupational Therapy   Frequency: 1 Week 1        Discharge Follow-up with PCP   As directed       Currently Documented PCP:    Josey Stovall APRN    PCP Phone Number:    [phone]     Follow Up Details: 1 week                Time spent on Discharge including face to face service:  >30 minutes    Signature: Electronically signed by Lori Manrique MD, 07/10/24, 17:00 EDT.  [facility] Hospitalist Team    Resident

## 2024-09-11 NOTE — ED BEHAVIORAL HEALTH PROGRESS NOTE - UNABLE TO CARE FOR SELF DETAILS
paranoid, non-compliant with medications
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 Uriarte on 10/24/23) who presented to the ED for AMS.  Patient currently AAOx2, unable to provide further history.  Unsure why she is in the hospital.  Per ED, patient was found to be confused over the past three days.  Also notes fatigue and worsening sundowning.  Patient currently has no active complaints.  Vitals stable, labs show elevated creatinine.  Will admit to med surg.  
paranoid, non-compliant with medications
paranoid, non-compliant with medications

## 2024-09-11 NOTE — ED BEHAVIORAL HEALTH PROGRESS NOTE - PSYCHIATRIC ISSUES AND PLAN (INCLUDE STANDING AND PRN MEDICATION)
c/w Depakote 250 mg po bid, can start zyprexa 5mg po qhs   may give PRN Zydis 5 mg PO q4hr PRN for acute agitation, f/u QTc < 500  Ativan 2mg k9ipude - if IV/IM must hold until 3 hours after Zyprexa IM admin

## 2024-09-11 NOTE — BH INPATIENT PSYCHIATRY ASSESSMENT NOTE - NSBHATTESTCOMMENTATTENDFT_PSY_A_CORE
50 yo female, Mandrin speaking, unknown PMH, PPH of schizophrenia per Psyckes with several admissions (last one listed at [facility] 2/24), recently taking Depakote per Psyckes BIB EMS after reportedly becoming agitated aggressive at home with  (Contact info for collateral info not available).   Per EMS report, pt was throwing things at .  Pt was seen in the [facility] ED, and deemed to require psychiatric admission per C/L. Pt was disorganized, in thoughts and behaviors- was licking menstrual blood of maxi pad; pt was unable to provide correct phone number for collateral. Here on the unit, patient presents as calm and cooperative, she is still expressing some paranoid delusions and endorsing AVH. Given history of prior psych admissions, disorganized behavior in ED that required IM medications, and agitation at home (i.e., throwing things at ), patient's presentation is consistent with psychosis and requires psychiatric admission for stabilization.
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
## 2024-09-11 NOTE — ED ADULT NURSE REASSESSMENT NOTE - DESCRIPTION
Pt is portesting her admission to psychiatry and wants to go home, Pt has episodes of agitation but de escalates w/o incident Pt is protesting her admission to psychiatry and wants to go home, Pt has episodes of agitation but de escalates w/o incident

## 2024-09-12 LAB
CHOLEST SERPL-MCNC: 204 MG/DL — HIGH
HDLC SERPL-MCNC: 61 MG/DL — SIGNIFICANT CHANGE UP
LIPID PNL WITH DIRECT LDL SERPL: 130 MG/DL — HIGH
NON HDL CHOLESTEROL: 143 MG/DL — HIGH
T3 SERPL-MCNC: 122 NG/DL — SIGNIFICANT CHANGE UP (ref 80–200)
T4 AB SER-ACNC: 8.64 UG/DL — SIGNIFICANT CHANGE UP (ref 5.1–13)
TRIGL SERPL-MCNC: 64 MG/DL — SIGNIFICANT CHANGE UP
TSH SERPL-MCNC: 2.27 UIU/ML — SIGNIFICANT CHANGE UP (ref 0.27–4.2)

## 2024-09-12 PROCEDURE — 99233 SBSQ HOSP IP/OBS HIGH 50: CPT | Mod: GC

## 2024-09-12 RX ORDER — PALIPERIDONE 3 MG/1
1.5 TABLET, EXTENDED RELEASE ORAL AT BEDTIME
Refills: 0 | Status: DISCONTINUED | OUTPATIENT
Start: 2024-09-12 | End: 2024-09-13

## 2024-09-12 RX ORDER — OLANZAPINE 7.5 MG/1
5 TABLET ORAL EVERY 4 HOURS
Refills: 0 | Status: DISCONTINUED | OUTPATIENT
Start: 2024-09-12 | End: 2024-09-20

## 2024-09-12 RX ORDER — TRAZODONE HCL 50 MG
50 TABLET ORAL AT BEDTIME
Refills: 0 | Status: DISCONTINUED | OUTPATIENT
Start: 2024-09-12 | End: 2024-09-20

## 2024-09-12 RX ADMIN — PALIPERIDONE 1.5 MILLIGRAM(S): 3 TABLET, EXTENDED RELEASE ORAL at 20:27

## 2024-09-12 RX ADMIN — LORAZEPAM 2 MILLIGRAM(S): 4 INJECTION INTRAMUSCULAR; INTRAVENOUS at 20:27

## 2024-09-12 NOTE — BH INPATIENT PSYCHIATRY PROGRESS NOTE - NSBHATTESTCOMMENTATTENDFT_PSY_A_CORE
50 yo female, Mandrin speaking, unknown PMH, PPH of schizophrenia per Psyckes with several admissions (last one listed at [facility] 2/24), recently taking Depakote per Psyckes BIB EMS after reportedly becoming agitated aggressive at home with  (Contact info for collateral info not available).   Per EMS report, pt was throwing things at .  Pt was seen in the [facility] ED, and deemed to require psychiatric admission per C/L. Pt was disorganized, in thoughts and behaviors- was licking menstrual blood of maxi pad; pt was unable to provide correct phone number for collateral. Here on the unit, patient presents as calm and cooperative, she is still expressing some paranoid delusions and endorsing AVH. Given history of prior psych admissions, disorganized behavior in ED that required IM medications, and agitation at home (i.e., throwing things at ), patient's presentation is consistent with psychosis and requires psychiatric admission for stabilization.

## 2024-09-12 NOTE — PSYCHIATRIC REHAB INITIAL EVALUATION - NSBHPRRECOMMEND_PSY_ALL_CORE
Writer met with patient in order to introduce patient to Psychiatric Rehabilitation Staff, department functions, orient patient to ML6 unit programming and to establish a Psychiatric Rehabilitation goal. Patient was elevated, disorganized, illogical and dressed appropriately during session. Patient had a difficult time identifying an appropriate goal with writer. Writer was able to give patient an appropriate goal located in their  plan of care. Psychiatric Rehabilitation Staff will continue to engage patient daily in order to develop rapport, provide support and to assist patient in demonstrating progress towards Psychiatric Rehabilitation.

## 2024-09-12 NOTE — BH SOCIAL WORK INITIAL PSYCHOSOCIAL EVALUATION - OTHER PAST PSYCHIATRIC HISTORY (INCLUDE DETAILS REGARDING ONSET, COURSE OF ILLNESS, INPATIENT/OUTPATIENT TREATMENT)
**PT WAS ADMITTED TO ED AT [facility] ON 24 UNDER MR # [id number].  Portion of psychosocial was copied from documentation while pt was in ED**    50 yo female, mostly Mandrin speaking but can also speak English, unknown PMH, PPH of schizophrenia (per Jaspal, last seen at [facility] 3/8/24, inpt at [facility] , [facility] 3/21. Recently taking Depakote) BIB EMS after reportedly becoming agitated aggressive at home with  (Contact info for collateral info not available). Per EMS report, pt was throwing things at .       When initially evaluated in ED, pt refused to answer questions, and then began licking underwear and menstruation pad.   Was able to be redirected to put on gown and was then tearful and crying uncontrollably. ED felt pt was unsafe and gave Zyprexa 10mg im x1.    during course of ED stay below was recorded:  AOx2 to self and location. Mental status exam was attempted and patient refused to participate. Patient answers "I don't know" to every question and couldn't give an explanation for EMS being called despite prompting. She endorses having been hospitalized for psychiatric reasons before, she doesn't know the names of medications she was taking, who her psychiatrist was. When asked to recall medications, she responds that she doesn't want to take any medications at all. Denies past violence. Denies hitting  or anybody, denies HI, and denies SI. Patient reported she lives in apartment with her . She works as an Home Health Aide.    Regarding collateral:  Pt first said "I don't remember" the number for . After a few minutes she provided this number [phone] for . The number was disconnected, and two other numbers from  were attempted, one disconnected and one VM left for Dr. Nunez at [phone]. Patient is an unreliable historian and has been demonstrating erratic behaviors during ED course. she's demonstrating impulsivity and had one attempt of pacing which was redirected by multiple staff. Pt also began picking her nose, darting eyes around for a place/person to fling.  1) [phone] pt reported for  - disconnected  2) [phone] from [employer] - "number you've dialed is not answering. please try again later"  3) [phone] from [facility] - Dr. Nunez - ED worker left a voicemail    Patient confirms visual hallucinations of devils, which she was dismissive of that being a problem. she feels healed by Bahai figure, 2 years ago, and that she was sleeping better and no longer has neck deformity, all better now. she said she does hear things that other people don't hear, she said she can ignore them and they don't tell her to hurt people or herself. Denies command hallucinations. Denies anxiety/paranoia. Denies all depression/jazz symptoms.  Denies SIIP/HIIP. She's refusing meds because she feels healed by a Bahai figure, 2 years ago, and that she was sleeping better and no longer has neck deformity, all better now so she doesn't need meds and is upset that they sedated her IM multiple times. Patient has poor insight into her behaviors.   Her family members are in [place], her parents are  and she has 3 brothers and two sisters. Patient states that her education level was some high school. She was able to correctly divide 100 by 7=14. Patient was assessed to be AOx2, disoriented to situation and time. Despite prompting, she was unable to recall the events that transpired prior to her admittance, but is able to recall when she received IM Zyprexa    While on Low 6:  pt was calm and cooperative, linear, focused on discharge, endorsing AVH. Pt states that she does not know why she is here. She feels "calm" and wants to go home. She denies any disorganized or agitated behavior while she was in the ED. She denies any past psychiatric history and states she does not take any medications at home. She denies any medical issues at this time. She does endorse some AVH, states she sees devils and demons and her  twin sister, but she is not afraid of them because is a [employer] and Abraham will protect her. When asked about CAH, she states that she would not listen to any of them, but declines to answer if the voices have commanded her to do anything. She denies SIIP and HIIP.    This writer will attempt to obtain collateral once pt is clearer and is less disorganized.

## 2024-09-13 RX ORDER — PALIPERIDONE 3 MG/1
3 TABLET, EXTENDED RELEASE ORAL AT BEDTIME
Refills: 0 | Status: DISCONTINUED | OUTPATIENT
Start: 2024-09-13 | End: 2024-09-16

## 2024-09-13 RX ADMIN — PALIPERIDONE 3 MILLIGRAM(S): 3 TABLET, EXTENDED RELEASE ORAL at 20:33

## 2024-09-13 RX ADMIN — LORAZEPAM 2 MILLIGRAM(S): 4 INJECTION INTRAMUSCULAR; INTRAVENOUS at 20:33

## 2024-09-13 NOTE — BH INPATIENT PSYCHIATRY PROGRESS NOTE - OTHER
flora ayala ?very mildly blunted re carrying child of huyen Rosarioy DC focused pt narrative "down after I hear the voices" but I am good now; we can make the voices go away? [seems surprised by this] mildly restless at times, improving

## 2024-09-14 PROCEDURE — 99232 SBSQ HOSP IP/OBS MODERATE 35: CPT

## 2024-09-14 RX ADMIN — PALIPERIDONE 3 MILLIGRAM(S): 3 TABLET, EXTENDED RELEASE ORAL at 20:26

## 2024-09-15 PROCEDURE — 99232 SBSQ HOSP IP/OBS MODERATE 35: CPT

## 2024-09-15 RX ADMIN — PALIPERIDONE 3 MILLIGRAM(S): 3 TABLET, EXTENDED RELEASE ORAL at 21:18

## 2024-09-16 PROCEDURE — 99232 SBSQ HOSP IP/OBS MODERATE 35: CPT

## 2024-09-16 RX ORDER — PALIPERIDONE 3 MG/1
4.5 TABLET, EXTENDED RELEASE ORAL AT BEDTIME
Refills: 0 | Status: DISCONTINUED | OUTPATIENT
Start: 2024-09-16 | End: 2024-09-18

## 2024-09-16 RX ADMIN — PALIPERIDONE 4.5 MILLIGRAM(S): 3 TABLET, EXTENDED RELEASE ORAL at 20:26

## 2024-09-16 NOTE — BH INPATIENT PSYCHIATRY PROGRESS NOTE - OTHER
Engages with MD via phone  re lucy, attenuating all attenuating re carrying child of Abraham, huyeny DC focused, attenuating "down after I hear the voices" but I am good now; we can make the voices go away? [seems surprised by this];  mildly elevated pt narrative ?very mildly blunted, now less constricted

## 2024-09-17 PROCEDURE — 99231 SBSQ HOSP IP/OBS SF/LOW 25: CPT

## 2024-09-17 RX ADMIN — PALIPERIDONE 4.5 MILLIGRAM(S): 3 TABLET, EXTENDED RELEASE ORAL at 20:06

## 2024-09-17 NOTE — BH INPATIENT PSYCHIATRY PROGRESS NOTE - OTHER
?very mildly blunted, now less constricted pt narrative re carrying child of Abraham, huyeny DC focused, attenuating Engages with MD via phone  "down after I hear the voices" but I am good now; we can make the voices go away? [seems surprised by this];  mildly elevated, attenuating re lucy, attenuating all attenuating

## 2024-09-18 PROCEDURE — 99233 SBSQ HOSP IP/OBS HIGH 50: CPT

## 2024-09-18 RX ORDER — PALIPERIDONE 3 MG/1
6 TABLET, EXTENDED RELEASE ORAL AT BEDTIME
Refills: 0 | Status: DISCONTINUED | OUTPATIENT
Start: 2024-09-18 | End: 2024-09-20

## 2024-09-18 RX ORDER — LORAZEPAM 4 MG/ML
1 INJECTION INTRAMUSCULAR; INTRAVENOUS EVERY 4 HOURS
Refills: 0 | Status: DISCONTINUED | OUTPATIENT
Start: 2024-09-18 | End: 2024-09-20

## 2024-09-18 RX ORDER — LORAZEPAM 4 MG/ML
2 INJECTION INTRAMUSCULAR; INTRAVENOUS ONCE
Refills: 0 | Status: DISCONTINUED | OUTPATIENT
Start: 2024-09-18 | End: 2024-09-20

## 2024-09-18 RX ADMIN — PALIPERIDONE 6 MILLIGRAM(S): 3 TABLET, EXTENDED RELEASE ORAL at 21:02

## 2024-09-18 NOTE — BH INPATIENT PSYCHIATRY PROGRESS NOTE - OTHER
re lucy, attenuating and largely resolved Engages with MD via phone  ?very mildly blunted, now less constricted pt narrative "down after I hear the voices" but I am good now; we can make the voices go away? [seems surprised by this];  mildly elevated, attenuating all attenuating re carrying child of Abraham, huyeny DC focused, now resolved

## 2024-09-18 NOTE — BH INPATIENT PSYCHIATRY PROGRESS NOTE - NSBHTIMEACTIVITIESPERFORMED_PSY_A_CORE
family meeting with  in mandarin via phone  case review in AM with full team  dispo planning
case review with team  interview in mandarin  psychoed re med options

## 2024-09-19 PROCEDURE — 99232 SBSQ HOSP IP/OBS MODERATE 35: CPT

## 2024-09-19 RX ORDER — PALIPERIDONE 3 MG/1
1 TABLET, EXTENDED RELEASE ORAL
Qty: 30 | Refills: 0
Start: 2024-09-19 | End: 2024-10-18

## 2024-09-19 RX ADMIN — PALIPERIDONE 6 MILLIGRAM(S): 3 TABLET, EXTENDED RELEASE ORAL at 20:26

## 2024-09-19 NOTE — BH INPATIENT PSYCHIATRY PROGRESS NOTE - PRN MEDS
MEDICATIONS  (PRN):  diphenhydrAMINE Injectable 50 milliGRAM(s) IntraMuscular every 6 hours PRN Agitation  LORazepam     Tablet 2 milliGRAM(s) Oral every 4 hours PRN agitation  LORazepam   Injectable 2 milliGRAM(s) IntraMuscular once PRN agitation  OLANZapine 5 milliGRAM(s) Oral every 4 hours PRN agitation  OLANZapine Injectable 5 milliGRAM(s) IntraMuscular once PRN agitation  traZODone 50 milliGRAM(s) Oral at bedtime PRN insomnia  
MEDICATIONS  (PRN):  diphenhydrAMINE Injectable 50 milliGRAM(s) IntraMuscular every 6 hours PRN Agitation  LORazepam     Tablet 1 milliGRAM(s) Oral every 4 hours PRN agitation  LORazepam   Injectable 2 milliGRAM(s) IntraMuscular once PRN agitation  OLANZapine 5 milliGRAM(s) Oral every 4 hours PRN agitation  OLANZapine Injectable 5 milliGRAM(s) IntraMuscular once PRN agitation  traZODone 50 milliGRAM(s) Oral at bedtime PRN insomnia  
MEDICATIONS  (PRN):  diphenhydrAMINE Injectable 50 milliGRAM(s) IntraMuscular every 6 hours PRN Agitation  LORazepam     Tablet 2 milliGRAM(s) Oral every 4 hours PRN agitation  LORazepam   Injectable 2 milliGRAM(s) IntraMuscular once PRN agitation  OLANZapine 5 milliGRAM(s) Oral every 4 hours PRN agitation  OLANZapine Injectable 5 milliGRAM(s) IntraMuscular once PRN agitation  traZODone 50 milliGRAM(s) Oral at bedtime PRN insomnia  
MEDICATIONS  (PRN):  diphenhydrAMINE Injectable 50 milliGRAM(s) IntraMuscular every 6 hours PRN Agitation  LORazepam     Tablet 2 milliGRAM(s) Oral every 4 hours PRN agitation  LORazepam   Injectable 2 milliGRAM(s) IntraMuscular once PRN agitation  OLANZapine 5 milliGRAM(s) Oral every 4 hours PRN agitation  OLANZapine Injectable 5 milliGRAM(s) IntraMuscular once PRN agitation  traZODone 50 milliGRAM(s) Oral at bedtime PRN insomnia  
MEDICATIONS  (PRN):  diphenhydrAMINE Injectable 50 milliGRAM(s) IntraMuscular every 6 hours PRN Agitation  LORazepam     Tablet 1 milliGRAM(s) Oral every 4 hours PRN agitation  LORazepam   Injectable 2 milliGRAM(s) IntraMuscular once PRN agitation  OLANZapine 5 milliGRAM(s) Oral every 4 hours PRN agitation  OLANZapine Injectable 5 milliGRAM(s) IntraMuscular once PRN agitation  traZODone 50 milliGRAM(s) Oral at bedtime PRN insomnia  
MEDICATIONS  (PRN):  diphenhydrAMINE Injectable 50 milliGRAM(s) IntraMuscular every 6 hours PRN Agitation  LORazepam     Tablet 2 milliGRAM(s) Oral every 4 hours PRN agitation  LORazepam   Injectable 2 milliGRAM(s) IntraMuscular once PRN agitation  OLANZapine 5 milliGRAM(s) Oral every 4 hours PRN agitation  OLANZapine Injectable 5 milliGRAM(s) IntraMuscular once PRN agitation  traZODone 50 milliGRAM(s) Oral at bedtime PRN insomnia  

## 2024-09-19 NOTE — BH INPATIENT PSYCHIATRY PROGRESS NOTE - NSBHATTESTTYPEVISIT_PSY_A_CORE
ALBERT without on-site Attending supervision
Attending Only
Attending Only
ALBERT without on-site Attending supervision
Attending Only
Attending with Resident/Fellow/Student

## 2024-09-19 NOTE — BH INPATIENT PSYCHIATRY PROGRESS NOTE - NSBHMSEPERCEPT_PSY_A_CORE
Auditory hallucinations
Auditory hallucinations/Visual hallucinations/Other
Auditory hallucinations/Other
Auditory hallucinations/Visual hallucinations/Other/Unable to assess
Auditory hallucinations/Visual hallucinations/Other
Auditory hallucinations/Other

## 2024-09-19 NOTE — BH INPATIENT PSYCHIATRY PROGRESS NOTE - NSBHFUPINTERVALHXFT_PSY_A_CORE
Michelle GUSMAN report received in case reviewed with full team in a.m., patient seen and MSE done. We had another lengthy session today in Mandarin with phone  Angeli [phone]. Initially patient was overly discharged focused as she was yesterday but we reviewed symptoms and patient is very clear that she is experiencing distress from auditory hallucinations and would in fact like a strategy to make these voices go away when she hears that this is possible and realistic with medication , that MD suggested. We continued to build on prior discussion and even discussed plan for a family meeting next week and will increase medication this weekend for greater affect. Patient is pleased and adequately related unless impatient. No new side effects reported or observed.
Michelle GUSMAN report received and case reviewed at team meeting in detail, patient scene and MSE done. ED note duly noted and appreciated, however, limitEd content as Zyprexa 10 mg Im prn medication was needed. Mandarin  Katya number [id number] was utilized for interpretation. However patient is bilingual but given detailed questioning and limits in vocabulary Patient and MD opted to conduct interview in Mandarin. Patient was able to endorse auditory hallucinations that have persisted over 10 years and visual hallucinations that have persisted for 20 years and patient describes content of AHH as having to do with angels that interfere with her life as they come from hell and attempt to lure her with a lot of talking“. She states that she is Khang‘s wife and says the voices are neither positive nor negative but she does not trust them. She states she does not recollect throwing items at her . She does feel after she she hears voices that her mood goes down, but is a limited historian and then clarifies first can visual hallucinations and then auditory hallucinations and subsequently the drop in mood but she’s not able to clarify, if her daily says frequently. MD asked patient about her unusual behavior and emergency room of licking her underwear and menstrual pad and says that she is pregnant now with the Lord baby and menstruation is coming and she licked the menstrual pad because it is the Lord baby, but then, additionally she says she doubts whether it is real so inside appears partial and limited but patient also not in favor of medication and MD explained to quiet all of these issues. Patient says she obtains help from her  and that is the answer. She says sleep is OK unless the angels interfere. No effort or wishes for self harm identified hence no CO warranted at this time. A time she laughs because she thinks her  brought her here because she is crazy and MD explained craziness is not a psychiatric term and also [facility] is not a state hospital. So we discussed psychosis as a symptom manifested in numerous illnesses, including mood disorders and schizophrenia. MD suggested again medication could help patient and she appears willing to consider this. No new side effects reported or observed.
Michelle rendon in report received and case reviewed with team in a.m., patient seen and MSE done. No acute events overnight and yesterday patient agreed to an increase in INVEGA. She knows that AH are attenuating. She also agrees to her family meeting with her  this week, possibly with goal of discharge as soon as Friday if team can ascertain safety at home. Social work and rn and md asking for accurate phone number, so it has to reach  to invite him to family meeting this week, but date phone numbers is given by patient are not correct. Improvements and relatedness and appears calm with adequate behavioral control. No side effects reported or observed.  
Michelle GUSMAN report received and case reviewed with full team in a.m., patient seen and MSE done. We used mandarin  Adel [phone] mandarin  number and  FABIÁN li join conversation also in Mandarin. We reviewed case and medication and symptoms and issues regarding stress at job and  indicated patient has two major mood episodes a year and MD explained how medication such as INVEGA will help with this. Patient offered an option for MARTINEZ but patient and  think pill is better. we made plan for Friday discharge if mandarin can be set up in time as patient currently does not have a provider that speaks Mandarin in [facility] and will need such. All questions answered and improvements noted and patient will trial invega 6 mg nightly for two nights. Patient can then decide on her optimal dose. No new side effects reported or observed.  
Pt compliant with medication and tolerating it well.  Chart reviewed, no events reported overnight.  Pt cooperative with interview, but declines Mandarin .  Pt speaks English to writer.  Pt denies SI/HI/I/P.  Pt reports AH and reports she knows the bible.  Pt reports she did not sleep last night because she is thinking too much.  Pt denies VH.  Pt eating well.
UZMA Martinez report received and case reviewed with team in a.m., patient seen and MSE done. No acute events over weekend and in fact, patient shows improvement and brighter affect emerging and tolerates discussion with MD via  phone and  Karey/Noble very well. She has good report with her MD and updating her MD about her improvements and is more stable and auditory hallucinations are attenuating on trial of inVega. She agrees that increase in medication may help further attenuate, auditory, hallucinations and increase to plan possibly also family meeting with  this week with discharge by Friday. No new side effects reported or observed.
Pt compliant with medication and tolerating it well.  Chart reviewed, no events reported overnight.  Pt in bed sleeping and refuses to get out of bed to go to  phone.  Pt appears in no apparent distress.
Michelle GUSMAN report received in case reviewed in team meeting in a.m., patient seen and ELDER Venkat. Mandarin  Mona SATISH is personal identifier helped with interpretation in mandarin via phone. Patient shows much improvement and thinks that she likes the higher dose of 6 mg more than 4.5 mg of I NVEGA. Consequently we will offer that dose at discharge tomorrow assuming no side effects or other issues. Patient very pleased with her progress and says mood improved and no SI of any sort and also AH largely resolved, but continues to have the feeling that angels are around her at times. MD cannot ascertain. This is entirely psychotic as it appears to impart the cultural/Restoration belief. No new side effects reported or observed.

## 2024-09-19 NOTE — BH INPATIENT PSYCHIATRY PROGRESS NOTE - NSBHMSETHTPROC_PSY_A_CORE
Disorganized/Perseverative/Illogical/Impaired reasoning
Linear/Circumstantial/Impaired reasoning
Linear/Circumstantial/Impaired reasoning
Linear/Circumstantial
Linear
Linear/Circumstantial
Impaired reasoning/Unable to assess
Linear/Circumstantial

## 2024-09-19 NOTE — BH INPATIENT PSYCHIATRY DISCHARGE NOTE - NSBHFUPINTERVALHXFT_PSY_A_CORE
Discharge Progress Note Date and Time: 09-20-24 @ 12:02    Patient seen via Mandarin phone  ID#[id number]. Patient found sitting in group. She reports she is looking forward to going home today, getting back to work, being with her , and reading her bible. She denies AH, stating they went away, though does report VH of seeing "some angels." Reports feeling safe, denies paranoid thinking. Denies SI, HI. Does report some intermittent head pressure she thinks is related to medication, denies visual changes, and overall feels it is manageable. Encouraged to see how progresses and discuss with OP doctor if continues, and to present to ER if acutely worsens. Denies muscle stiffness, restlessness. Reminded that she can come to the ER or call 911 at any time if she feels unsafe.

## 2024-09-19 NOTE — BH INPATIENT PSYCHIATRY PROGRESS NOTE - ADDITIONAL DETAILS / COMMENTS
explains AH and VH as Gnosticist experience
explains AH and VH as Roman Catholic experience, agrees to PO meds, both now improving
explains AH and VH as Nondenominational experience
explains AH and VH as Latter day experience  both I&J slightly improved, less perseverative re DC, agrees to trial of medication
explains AH and VH as Muslim experience
explains AH and VH as Taoism experience  both I&J slightly improved, less perseverative re DC, agrees to trial of medication
explains AH and VH as Gnosticist experience  both I&J slightly improved, less perseverative re DC, agrees to trial of medication
explains AH and VH as Nondenominational experience  both I&J slightly improved, less perseverative re DC, agrees to trial of medication

## 2024-09-19 NOTE — BH INPATIENT PSYCHIATRY PROGRESS NOTE - NSBHMSEMOVE_PSY_A_CORE
Unable to assess
Unable to assess
No abnormal movements
Unable to assess
No abnormal movements
No abnormal movements

## 2024-09-19 NOTE — BH INPATIENT PSYCHIATRY PROGRESS NOTE - NSBHMSEMUSCLE_PSY_A_CORE
Unable to assess
Unable to assess
Normal muscle tone/strength
Unable to assess

## 2024-09-19 NOTE — BH TREATMENT PLAN - NSTXSLPPATINTERMD_PSY_ALL_CORE
psychopharm with possible MARTINEZ and supportive therapy with aftercare referral
psychopharm with possible MARTINEZ and supportive therapy with aftercare referral

## 2024-09-19 NOTE — BH INPATIENT PSYCHIATRY PROGRESS NOTE - NSBHMSEAFFQUAL_PSY_A_CORE
Depressed/Anxious
Euthymic/Anxious/Other
Elevated/Anxious
Depressed/Anxious
Elevated/Anxious/Other
Depressed/Anxious
Elevated/Anxious/Other
Elevated/Anxious/Other

## 2024-09-19 NOTE — BH INPATIENT PSYCHIATRY PROGRESS NOTE - NSTXMEDICGOAL_PSY_ALL_CORE
Take all medications as prescribed
Be able to describe the benefit of medication/treatment
Take all medications as prescribed
Take all medications as prescribed
Be able to describe the benefit of medication/treatment
Take all medications as prescribed

## 2024-09-19 NOTE — BH INPATIENT PSYCHIATRY PROGRESS NOTE - NSBHCHARTREVIEWVS_PSY_A_CORE FT
Vital Signs Last 24 Hrs  T(C): 36.6 (09-15-24 @ 08:12), Max: 36.6 (09-15-24 @ 08:12)  T(F): 97.9 (09-15-24 @ 08:12), Max: 97.9 (09-15-24 @ 08:12)  HR: --  BP: 136/88 (09-15-24 @ 08:12) (136/88 - 136/88)  BP(mean): 80 (09-15-24 @ 08:12) (80 - 80)  RR: --  SpO2: --    Orthostatic VS  09-14-24 @ 07:39  Lying BP: --/-- HR: --  Sitting BP: 130/80 HR: 78  Standing BP: 109/74 HR: 108  Site: --  Mode: --  
Vital Signs Last 24 Hrs  T(C): 36.9 (09-17-24 @ 07:44), Max: 36.9 (09-17-24 @ 07:44)  T(F): 98.5 (09-17-24 @ 07:44), Max: 98.5 (09-17-24 @ 07:44)  HR: --  BP: --  BP(mean): --  RR: --  SpO2: --    Orthostatic VS  09-17-24 @ 07:44  Lying BP: --/-- HR: --  Sitting BP: 125/75 HR: 85  Standing BP: 135/65 HR: 80  Site: --  Mode: --  
Vital Signs Last 24 Hrs  T(C): 36.3 (09-16-24 @ 08:05), Max: 36.3 (09-16-24 @ 08:05)  T(F): 97.3 (09-16-24 @ 08:05), Max: 97.3 (09-16-24 @ 08:05)  HR: 60 (09-16-24 @ 08:05) (60 - 60)  BP: 144/84 (09-16-24 @ 08:05) (144/84 - 144/84)  BP(mean): --  RR: --  SpO2: --    
Vital Signs Last 24 Hrs  T(C): 36.8 (09-14-24 @ 07:39), Max: 36.8 (09-14-24 @ 07:39)  T(F): 98.2 (09-14-24 @ 07:39), Max: 98.2 (09-14-24 @ 07:39)  HR: --  BP: --  BP(mean): --  RR: --  SpO2: --    Orthostatic VS  09-14-24 @ 07:39  Lying BP: --/-- HR: --  Sitting BP: 130/80 HR: 78  Standing BP: 109/74 HR: 108  Site: --  Mode: --  Orthostatic VS  09-13-24 @ 06:37  Lying BP: --/-- HR: --  Sitting BP: 128/82 HR: 85  Standing BP: 125/83 HR: 94  Site: --  Mode: --  
Vital Signs Last 24 Hrs  T(C): 36.3 (09-19-24 @ 07:25), Max: 36.3 (09-19-24 @ 07:25)  T(F): 97.3 (09-19-24 @ 07:25), Max: 97.3 (09-19-24 @ 07:25)  HR: --  BP: --  BP(mean): --  RR: --  SpO2: --    Orthostatic VS  09-19-24 @ 07:25  Lying BP: --/-- HR: --  Sitting BP: 103/66 HR: 72  Standing BP: --/-- HR: --  Site: --  Mode: --  
Vital Signs Last 24 Hrs  T(C): 36.7 (09-18-24 @ 07:51), Max: 36.7 (09-18-24 @ 07:51)  T(F): 98 (09-18-24 @ 07:51), Max: 98 (09-18-24 @ 07:51)  HR: 85 (09-18-24 @ 07:51) (85 - 85)  BP: 111/68 (09-18-24 @ 07:51) (111/68 - 111/68)  BP(mean): --  RR: --  SpO2: --    Orthostatic VS  09-17-24 @ 07:44  Lying BP: --/-- HR: --  Sitting BP: 125/75 HR: 85  Standing BP: 135/65 HR: 80  Site: --  Mode: --  
Vital Signs Last 24 Hrs  T(C): 36.7 (09-12-24 @ 07:35), Max: 36.7 (09-12-24 @ 07:35)  T(F): 98.1 (09-12-24 @ 07:35), Max: 98.1 (09-12-24 @ 07:35)  HR: --  BP: 122/81 (09-12-24 @ 07:35) (122/81 - 122/81)  BP(mean): 68 (09-12-24 @ 07:35) (68 - 68)  RR: --  SpO2: --    Orthostatic VS  09-11-24 @ 17:45  Lying BP: --/-- HR: --  Sitting BP: 141/88 HR: 96  Standing BP: 135/89 HR: 101  Site: --  Mode: --  
Vital Signs Last 24 Hrs  T(C): 36.8 (09-14-24 @ 07:39), Max: 36.8 (09-14-24 @ 07:39)  T(F): 98.2 (09-14-24 @ 07:39), Max: 98.2 (09-14-24 @ 07:39)  HR: --  BP: --  BP(mean): --  RR: --  SpO2: --    Orthostatic VS  09-14-24 @ 07:39  Lying BP: --/-- HR: --  Sitting BP: 130/80 HR: 78  Standing BP: 109/74 HR: 108  Site: --  Mode: --  Orthostatic VS  09-13-24 @ 06:37  Lying BP: --/-- HR: --  Sitting BP: 128/82 HR: 85  Standing BP: 125/83 HR: 94  Site: --  Mode: --

## 2024-09-19 NOTE — BH DISCHARGE NOTE NURSING/SOCIAL WORK/PSYCH REHAB - PATIENT PORTAL LINK FT
You can access the FollowMyHealth Patient Portal offered by [facility] by registering at the following website: [email or web address]. By joining [facility]’s FollowMyHealth portal, you will also be able to view your health information using other applications (apps) compatible with our system.

## 2024-09-19 NOTE — BH DISCHARGE NOTE NURSING/SOCIAL WORK/PSYCH REHAB - NSBHDCADDR1FT_A_CORE
[address],   [place] [address] This appt will be virtual, and they will call you at the time of your appointment

## 2024-09-19 NOTE — BH DISCHARGE NOTE NURSING/SOCIAL WORK/PSYCH REHAB - NSCDUDCCRISIS_PSY_A_CORE
[facility]  1 [phone]) [facility] ([phone])  Text "WELL" to 29354  Website: [email or web address].Moneybook2u.Com/.National Suicide Prevention Lifeline 1 (723) 426-3341/.  [employer]  1 (892) LIF[employer] (049-4955)/.  [facility]  [address] [place] [id number] [phone]   Hours:  Monday through Friday from 9 AM to 3 PM

## 2024-09-19 NOTE — BH INPATIENT PSYCHIATRY PROGRESS NOTE - NSTXANXGOAL_PSY_ALL_CORE
Be able to participate in activities despite lingering anxiety/panic

## 2024-09-19 NOTE — BH INPATIENT PSYCHIATRY PROGRESS NOTE - NSDCCRITERIA_PSY_ALL_CORE
Good behavioral control and reduction of acute psychotic symptoms  possible MARTINEZ

## 2024-09-19 NOTE — BH INPATIENT PSYCHIATRY PROGRESS NOTE - NSBHMSETHTCONTENT_PSY_A_CORE
Delusions/Preoccupations/Other
Delusions/Other
Delusions/Preoccupations/Other
Delusions/Preoccupations/Other
Delusions/Preoccupations
Delusions/Preoccupations/Other
Other/Unable to assess
Delusions/Other

## 2024-09-19 NOTE — BH INPATIENT PSYCHIATRY DISCHARGE NOTE - HPI (INCLUDE ILLNESS QUALITY, SEVERITY, DURATION, TIMING, CONTEXT, MODIFYING FACTORS, ASSOCIATED SIGNS AND SYMPTOMS)
48 yo female, Mandrin speaking, unknown PMH, PPH of schizophrenia per [facility] with several admissions (last one listed at [facility] ), recently taking Depakote per [facility] BIB EMS after reportedy becoming agitated aggressive at home with  (Contact info for collateral info not available). Per EMS report, pt was throwing things at .       Per  ED Assessment Note on 14:  "When evaluated in ED, pt refused to answer questions, and then began licking underwear and menstruation pad.   Was able to be redirected to put on gown and was then tearful and crying uncontrollably. ED felt pt was unsafe and gave Zyprexa 10mg im x1.    On my interview, pt sleeping.  Able to wake up when 1:1 says her name loudly but quickly falls asleep.  Pt not able to participate in full interview."    On unit today:  Pt seen by SP[facility] team. On assessment today, pt was calm and cooperative, linear, focused on discharge, endorsing AVH. Pt states that she does not know why she is here. She feels "calm" and wants to go home. She denies any disorganized or agitated behavior while she was in the ED. She denies any past psychiatric history and states she does not take any medications at home. She denies any medical issues at this time. She does endorse some AVH, states she sees devils and demons and her  twin sister, but she is not afraid of them because is a Latter day and Abraham will protect her. When asked about CAH, she states that she would not listen to any of them, but declines to answer if the voices have commanded her to do anything. She denies SIIP and HIIP.

## 2024-09-19 NOTE — BH INPATIENT PSYCHIATRY PROGRESS NOTE - NSTXDCOPNOGOAL_PSY_ALL_CORE
Will agree to participate in appropriate outpatient care
Will agree to participate in appropriate outpatient care
yes
Will agree to participate in appropriate outpatient care

## 2024-09-19 NOTE — BH INPATIENT PSYCHIATRY PROGRESS NOTE - NSTXDCOPNOINTERMD_PSY_ALL_CORE
psychopharm with possible MARTINEZ and supportive therapy with aftercare referral
psychopharm with possible MRATINEZ and supportive therapy with aftercare referral
psychopharm with possible MARTINEZ and supportive therapy with aftercare referral

## 2024-09-19 NOTE — BH INPATIENT PSYCHIATRY PROGRESS NOTE - NSBHMSEKNOWHOW_PSY_ALL_CORE
Current Events/Vocabulary/Other...
Current Events/Vocabulary/Other...
Vocabulary/Other...
Current Events/Educational attainment/Vocabulary/Other...
Current Events/Vocabulary/Other...

## 2024-09-19 NOTE — BH INPATIENT PSYCHIATRY DISCHARGE NOTE - NSBHMETABOLIC_PSY_ALL_CORE_FT
BMI: BMI (kg/m2): 21.5 (09-11-24 @ 17:45)  HbA1c:   Glucose:   BP: 111/68 (09-18-24 @ 07:51) (111/68 - 111/68)Vital Signs Last 24 Hrs  T(C): 36.3 (09-19-24 @ 07:25), Max: 36.3 (09-19-24 @ 07:25)  T(F): 97.3 (09-19-24 @ 07:25), Max: 97.3 (09-19-24 @ 07:25)  HR: --  BP: --  BP(mean): --  RR: --  SpO2: --    Orthostatic VS  09-19-24 @ 07:25  Lying BP: --/-- HR: --  Sitting BP: 103/66 HR: 72  Standing BP: --/-- HR: --  Site: --  Mode: --    Lipid Panel: Date/Time: 09-12-24 @ 10:48  Cholesterol, Serum: 204  LDL Cholesterol Calculated: 130  HDL Cholesterol, Serum: 61  Total Cholesterol/HDL Ration Measurement: --  Triglycerides, Serum: 64

## 2024-09-19 NOTE — BH INPATIENT PSYCHIATRY PROGRESS NOTE - OTHER
pt narrative re carrying child of Abraham, huyeny DC focused, now resolved re lucy, attenuating and largely resolved, now says more a feeling good, the voices are gone ?very mildly blunted, now less constricted all attenuating Engages with MD via phone

## 2024-09-19 NOTE — BH INPATIENT PSYCHIATRY PROGRESS NOTE - CURRENT MEDICATION
MEDICATIONS  (STANDING):  paliperidone ER 3 milliGRAM(s) Oral at bedtime    MEDICATIONS  (PRN):  diphenhydrAMINE Injectable 50 milliGRAM(s) IntraMuscular every 6 hours PRN Agitation  LORazepam     Tablet 2 milliGRAM(s) Oral every 4 hours PRN agitation  LORazepam   Injectable 2 milliGRAM(s) IntraMuscular once PRN agitation  OLANZapine 5 milliGRAM(s) Oral every 4 hours PRN agitation  OLANZapine Injectable 5 milliGRAM(s) IntraMuscular once PRN agitation  traZODone 50 milliGRAM(s) Oral at bedtime PRN insomnia  
MEDICATIONS  (STANDING):  paliperidone ER 4.5 milliGRAM(s) Oral at bedtime    MEDICATIONS  (PRN):  diphenhydrAMINE Injectable 50 milliGRAM(s) IntraMuscular every 6 hours PRN Agitation  LORazepam     Tablet 2 milliGRAM(s) Oral every 4 hours PRN agitation  LORazepam   Injectable 2 milliGRAM(s) IntraMuscular once PRN agitation  OLANZapine 5 milliGRAM(s) Oral every 4 hours PRN agitation  OLANZapine Injectable 5 milliGRAM(s) IntraMuscular once PRN agitation  traZODone 50 milliGRAM(s) Oral at bedtime PRN insomnia  
MEDICATIONS  (STANDING):  paliperidone ER 3 milliGRAM(s) Oral at bedtime    MEDICATIONS  (PRN):  diphenhydrAMINE Injectable 50 milliGRAM(s) IntraMuscular every 6 hours PRN Agitation  LORazepam     Tablet 2 milliGRAM(s) Oral every 4 hours PRN agitation  LORazepam   Injectable 2 milliGRAM(s) IntraMuscular once PRN agitation  OLANZapine 5 milliGRAM(s) Oral every 4 hours PRN agitation  OLANZapine Injectable 5 milliGRAM(s) IntraMuscular once PRN agitation  traZODone 50 milliGRAM(s) Oral at bedtime PRN insomnia  
MEDICATIONS  (STANDING):  paliperidone ER 4.5 milliGRAM(s) Oral at bedtime    MEDICATIONS  (PRN):  diphenhydrAMINE Injectable 50 milliGRAM(s) IntraMuscular every 6 hours PRN Agitation  LORazepam     Tablet 2 milliGRAM(s) Oral every 4 hours PRN agitation  LORazepam   Injectable 2 milliGRAM(s) IntraMuscular once PRN agitation  OLANZapine 5 milliGRAM(s) Oral every 4 hours PRN agitation  OLANZapine Injectable 5 milliGRAM(s) IntraMuscular once PRN agitation  traZODone 50 milliGRAM(s) Oral at bedtime PRN insomnia  
MEDICATIONS  (STANDING):  paliperidone ER 1.5 milliGRAM(s) Oral at bedtime    MEDICATIONS  (PRN):  diphenhydrAMINE Injectable 50 milliGRAM(s) IntraMuscular every 6 hours PRN Agitation  LORazepam     Tablet 2 milliGRAM(s) Oral every 4 hours PRN agitation  LORazepam   Injectable 2 milliGRAM(s) IntraMuscular once PRN agitation  OLANZapine 5 milliGRAM(s) Oral every 4 hours PRN agitation  OLANZapine Injectable 5 milliGRAM(s) IntraMuscular once PRN agitation  traZODone 50 milliGRAM(s) Oral at bedtime PRN insomnia  
MEDICATIONS  (STANDING):  paliperidone ER 3 milliGRAM(s) Oral at bedtime    MEDICATIONS  (PRN):  diphenhydrAMINE Injectable 50 milliGRAM(s) IntraMuscular every 6 hours PRN Agitation  LORazepam     Tablet 2 milliGRAM(s) Oral every 4 hours PRN agitation  LORazepam   Injectable 2 milliGRAM(s) IntraMuscular once PRN agitation  OLANZapine 5 milliGRAM(s) Oral every 4 hours PRN agitation  OLANZapine Injectable 5 milliGRAM(s) IntraMuscular once PRN agitation  traZODone 50 milliGRAM(s) Oral at bedtime PRN insomnia  
MEDICATIONS  (STANDING):  paliperidone ER 6 milliGRAM(s) Oral at bedtime    MEDICATIONS  (PRN):  diphenhydrAMINE Injectable 50 milliGRAM(s) IntraMuscular every 6 hours PRN Agitation  LORazepam     Tablet 1 milliGRAM(s) Oral every 4 hours PRN agitation  LORazepam   Injectable 2 milliGRAM(s) IntraMuscular once PRN agitation  OLANZapine 5 milliGRAM(s) Oral every 4 hours PRN agitation  OLANZapine Injectable 5 milliGRAM(s) IntraMuscular once PRN agitation  traZODone 50 milliGRAM(s) Oral at bedtime PRN insomnia  
MEDICATIONS  (STANDING):  paliperidone ER 6 milliGRAM(s) Oral at bedtime    MEDICATIONS  (PRN):  diphenhydrAMINE Injectable 50 milliGRAM(s) IntraMuscular every 6 hours PRN Agitation  LORazepam     Tablet 1 milliGRAM(s) Oral every 4 hours PRN agitation  LORazepam   Injectable 2 milliGRAM(s) IntraMuscular once PRN agitation  OLANZapine 5 milliGRAM(s) Oral every 4 hours PRN agitation  OLANZapine Injectable 5 milliGRAM(s) IntraMuscular once PRN agitation  traZODone 50 milliGRAM(s) Oral at bedtime PRN insomnia

## 2024-09-19 NOTE — BH INPATIENT PSYCHIATRY PROGRESS NOTE - NSBHASSESSSUMMFT_PSY_ALL_CORE
50 yo female, Mandrin speaking, unknown PMH, PPH of schizophrenia per Psyckes with several admissions (last one listed at [facility] 2/24), recently taking Depakote per Psy[facility]es BIB EMS after reportedly becoming agitated aggressive at home with  (Contact info for collateral info not available).   Per EMS report, pt was throwing things at .  Pt was seen in the [facility] ED, and deemed to require psychiatric admission per C/L. Pt was disorganized, in thoughts and behaviors- was licking menstrual blood of maxi pad; pt was unable to provide correct phone number for collateral. Here on the unit, patient presents as calm and cooperative, she is still expressing some paranoid delusions and endorsing AVH. Given history of prior psych admissions, disorganized behavior in ED that required IM medications, and agitation at home (i.e., throwing things at ), patient's presentation is consistent with psychosis and requires psychiatric admission for stabilization. Given history of prior psych admissions, disorganized behavior in ED that required IM medications, and agitation at home (i.e., throwing things at ), patient's presentation is consistent with psychosis and requires psychiatric admission for stabilization.     Pt with elevated tSH- panel ordered for AM. Please f/u.    PLAN  2PC for safety  CO not warranted  PLAN:  stop zyprexa 5 for invega 1.5 qhs and now 3 qhs and monitor-- increase MON to 4.5 qhs and WED 6 qhs  Consider MARTINEZ options  PRNs  G&M therapy  Review ED labs  Supportive therapy in [facility]  Family meeting in [facility], ideally THURS  DISPO TBD-- [facility] services as o/p
50 yo female, Mandrin speaking, unknown PMH, PPH of schizophrenia per Psyckes with several admissions (last one listed at [facility] 2/24), recently taking Depakote per Psy[facility]es BIB EMS after reportedly becoming agitated aggressive at home with  (Contact info for collateral info not available).   Per EMS report, pt was throwing things at .  Pt was seen in the [facility] ED, and deemed to require psychiatric admission per C/L. Pt was disorganized, in thoughts and behaviors- was licking menstrual blood of maxi pad; pt was unable to provide correct phone number for collateral. Here on the unit, patient presents as calm and cooperative, she is still expressing some paranoid delusions and endorsing AVH. Given history of prior psych admissions, disorganized behavior in ED that required IM medications, and agitation at home (i.e., throwing things at ), patient's presentation is consistent with psychosis and requires psychiatric admission for stabilization. Given history of prior psych admissions, disorganized behavior in ED that required IM medications, and agitation at home (i.e., throwing things at ), patient's presentation is consistent with psychosis and requires psychiatric admission for stabilization.     Pt with elevated tSH- panel ordered for AM. Please f/u.    PLAN  2PC for safety  CO not warranted  PLAN:  stop zyprexa 5 for invega 1.5 qhs and now 3 qhs and monitor-- increase MON to 4.5 qhs and WED 6 qhs  Consider MARTINEZ options  PRNs  G&M therapy  Review ED labs  Supportive therapy in [facility]  Family meeting in [facility], ideally [place]UR[place]  DISPO TBD-- [facility] services as o/p
50 yo female, Mandrin speaking, unknown PMH, PPH of schizophrenia per Psyckes with several admissions (last one listed at [facility] 2/24), recently taking Depakote per [facility]es BIB EMS after reportedly becoming agitated aggressive at home with  (Contact info for collateral info not available).   Per EMS report, pt was throwing things at .      While patient presents as calm and cooperative, she is still expressing some paranoid delusions and endorsing AVH. Given history of prior psych admissions, disorganized behavior in ED that required IM medications, and agitation at home (i.e., throwing things at ), patient's presentation is consistent with psychosis and requires psychiatric admission for stabilization.     Pt with elevated tSH- panel ordered for AM. Please f/u.    PLAN  2PC for safety  CO not warranted  PLAN:  stop zyprexa 5 for invega 1.5 qhs then attempt 3 qhs  PRNs  G&M therapy  Review ED labs  Supportive therapy in [facility]  DISPO TBD-- [facility] services as o/p
50 yo female, Mandrin speaking, unknown PMH, PPH of schizophrenia per Psyckes with several admissions (last one listed at [facility] 2/24), recently taking Depakote per [facility] BIB EMS after reportedly becoming agitated aggressive at home with  (Contact info for collateral info not available).   Per EMS report, pt was throwing things at .      While patient presents as calm and cooperative, she is still expressing some paranoid delusions and endorsing AVH. Given history of prior psych admissions, disorganized behavior in ED that required IM medications, and agitation at home (i.e., throwing things at ), patient's presentation is consistent with psychosis and requires psychiatric admission for stabilization.     Pt with elevated tSH- panel ordered for AM. Please f/u.    PLAN  2PC for safety  CO not warranted  PLAN:  stop zyprexa 5 for invega 1.5 qhs then attempt 3 qhs  PRNs  G&M therapy  Review ED labs  Supportive therapy in [facility]  DISPO TBD-- [facility] services as o/p
50 yo female, Mandrin speaking, unknown PMH, PPH of schizophrenia per Psyckes with several admissions (last one listed at [facility] 2/24), recently taking Depakote per [facility] BIB EMS after reportedly becoming agitated aggressive at home with  (Contact info for collateral info not available).   Per EMS report, pt was throwing things at .  Pt was seen in the [facility] ED, and deemed to require psychiatric admission per C/L. Pt was disorganized, in thoughts and behaviors- was licking menstrual blood of maxi pad; pt was unable to provide correct phone number for collateral. Here on the unit, patient presents as calm and cooperative, she is still expressing some paranoid delusions and endorsing AVH. Given history of prior psych admissions, disorganized behavior in ED that required IM medications, and agitation at home (i.e., throwing things at ), patient's presentation is consistent with psychosis and requires psychiatric admission for stabilization. Given history of prior psych admissions, disorganized behavior in ED that required IM medications, and agitation at home (i.e., throwing things at ), patient's presentation is consistent with psychosis and requires psychiatric admission for stabilization.     Pt with elevated tSH- panel ordered for AM. Please f/u.    PLAN  2PC for safety  CO not warranted  PLAN:  stop zyprexa 5 for invega 1.5 qhs and now 3 qhs and monitor-- increase MON to 4.5 qhs and WED 6 qhs  Consider MARTINEZ options  PRNs  G&M therapy  Review ED labs  Supportive therapy in [facility]  Family meeting in [facility], ideally [place]  DISPO TBD-- [facility] services as o/p
50 yo female, Mandrin speaking, unknown PMH, PPH of schizophrenia per Psyckes with several admissions (last one listed at [facility] 2/24), recently taking Depakote per Psy[facility]es BIB EMS after reportedly becoming agitated aggressive at home with  (Contact info for collateral info not available).   Per EMS report, pt was throwing things at .  Pt was seen in the [facility] ED, and deemed to require psychiatric admission per C/L. Pt was disorganized, in thoughts and behaviors- was licking menstrual blood of maxi pad; pt was unable to provide correct phone number for collateral. Here on the unit, patient presents as calm and cooperative, she is still expressing some paranoid delusions and endorsing AVH. Given history of prior psych admissions, disorganized behavior in ED that required IM medications, and agitation at home (i.e., throwing things at ), patient's presentation is consistent with psychosis and requires psychiatric admission for stabilization. Given history of prior psych admissions, disorganized behavior in ED that required IM medications, and agitation at home (i.e., throwing things at ), patient's presentation is consistent with psychosis and requires psychiatric admission for stabilization.     Pt with elevated tSH- panel ordered for AM. Please f/u.    PLAN  2PC for safety  CO not warranted  PLAN:  stop zyprexa 5 for invega 1.5 qhs and now 3 qhs and monitor-- increase MON to 4.5 qhs and WED 6 qhs  Consider MARTINEZ options  PRNs  G&M therapy  Review ED labs  Supportive therapy in [facility]  Family meeting in [facility], ideally [place]UR[place]  DISPO TBD-- [facility] services as o/p
48 yo female, Mandrin speaking, unknown PMH, PPH of schizophrenia per Psyckes with several admissions (last one listed at [facility] 2/24), recently taking Depakote per [facility]es BIB EMS after reportedly becoming agitated aggressive at home with  (Contact info for collateral info not available).   Per EMS report, pt was throwing things at .      While patient presents as calm and cooperative, she is still expressing some paranoid delusions and endorsing AVH. Given history of prior psych admissions, disorganized behavior in ED that required IM medications, and agitation at home (i.e., throwing things at ), patient's presentation is consistent with psychosis and requires psychiatric admission for stabilization.     Pt with elevated tSH- panel ordered for AM. Please f/u.    PLAN  2PC for safety  CO not warranted  PLAN:  stop zyprexa 5 for invega 1.5 qhs then attempt 3 qhs  PRNs  G&M therapy  Review ED labs  Supportive therapy in [facility]  DISPO TBD-- [facility] services as o/p
50 yo female, Mandrin speaking, unknown PMH, PPH of schizophrenia per Psyckes with several admissions (last one listed at [facility] 2/24), recently taking Depakote per Psyckes BIB EMS after reportedly becoming agitated aggressive at home with  (Contact info for collateral info not available).   Per EMS report, pt was throwing things at .  Pt was seen in the [facility] ED, and deemed to require psychiatric admission per C/L. Pt was disorganized, in thoughts and behaviors- was licking menstrual blood of maxi pad; pt was unable to provide correct phone number for collateral. Here on the unit, patient presents as calm and cooperative, she is still expressing some paranoid delusions and endorsing AVH. Given history of prior psych admissions, disorganized behavior in ED that required IM medications, and agitation at home (i.e., throwing things at ), patient's presentation is consistent with psychosis and requires psychiatric admission for stabilization. Given history of prior psych admissions, disorganized behavior in ED that required IM medications, and agitation at home (i.e., throwing things at ), patient's presentation is consistent with psychosis and requires psychiatric admission for stabilization.     Pt with elevated tSH- panel ordered for AM. Please f/u.    PLAN  2PC for safety  CO not warranted  PLAN:  stop zyprexa 5 for invega 1.5 qhs and now 3 qhs and monitor  Consider MARTINEZ options  PRNs  G&M therapy  Review ED labs  Supportive therapy in [place]  DISPO TBD-- [facility] services as o/p

## 2024-09-19 NOTE — BH TREATMENT PLAN - NSTXMEDICINTERPR_PSY_ALL_CORE
Psychiatric Rehabilitation staff met with patient to help them establish a goal. Patient’s goal is to be able to describe the benefit of medication/treatment.
Psychiatric Rehabilitation staff met with patient to help them establish a goal. Patient’s goal is to be able to take all medications prescribed.

## 2024-09-19 NOTE — BH TREATMENT PLAN - NSTXPSYCHOINTERRN_PSY_ALL_CORE
Pt. encouraged to report triggers/stressors that precipitate hallucinations and report to staff when they are hallucinating. Pt. encouraged to identify effective coping skills and utlilize effective coping skills when hallucinations begin. Pt. encouraged to seek staff support if known coping skills are not effective that way pt. can brainstorm effective coping skills with staff.
Pt. encouraged to report triggers/stressors that precipitate hallucinations and report to staff when they are hallucinating. Pt. encouraged to identify effective coping skills and utlilize effective coping skills when hallucinations begin. Pt. encouraged to seek staff support if known coping skills are not effective that way pt. can brainstorm effective coping skills with staff.

## 2024-09-19 NOTE — BH INPATIENT PSYCHIATRY PROGRESS NOTE - NSBHMSEBEHAV_PSY_A_CORE
Cooperative/Other
Cooperative
Cooperative/Other
Cooperative
Cooperative/Other
Uncooperative
Cooperative/Other
Cooperative/Other

## 2024-09-19 NOTE — BH INPATIENT PSYCHIATRY PROGRESS NOTE - NSBHFUPINTERVALCCFT_PSY_A_CORE
R/u for SCZ/psychosis+disorg+agitation  SCZ vs SAD

## 2024-09-19 NOTE — BH INPATIENT PSYCHIATRY PROGRESS NOTE - NSBHMSEINTELL_PSY_A_CORE
Below Average
Unable to assess
Below Average
Unable to assess

## 2024-09-19 NOTE — BH TREATMENT PLAN - NSCMSPTSTRENGTHS_PSY_ALL_CORE
Assertive/Expressive of emotions/Intact employment/Motivated/Physically healthy/Self-reliant
Assertive/Expressive of emotions/Intact employment/Motivated/Physically healthy/Self-reliant

## 2024-09-19 NOTE — BH TREATMENT PLAN - NSDCCRITERIA_PSY_ALL_CORE
Good behavioral control and reduction of acute psychotic symptoms  possible MARTINEZ
Good behavioral control and reduction of acute psychotic symptoms  possible MARTINEZ

## 2024-09-19 NOTE — BH TREATMENT PLAN - NSTXDCOPNOINTERSW_PSY_ALL_CORE
SW will encourage pt to comply with OP tx and medication post d/c to increase likelihood of psychiatric stability once back in the community.
SW will encourage pt to comply with OP tx and medication post d/c to increase likelihood of psychiatric stability once back in the community.

## 2024-09-19 NOTE — BH INPATIENT PSYCHIATRY PROGRESS NOTE - NSBHMSEAFFRANGE_PSY_A_CORE
Constricted/Other
Constricted
Blunted/Constricted/Other
Labile
Blunted/Constricted/Other
Blunted/Constricted/Other
Constricted/Other
Blunted/Constricted/Other

## 2024-09-19 NOTE — BH INPATIENT PSYCHIATRY PROGRESS NOTE - NSBHATTESTBILLING_PSY_A_CORE
07447-Zqvewytqwo OBS or IP - moderate complexity OR 35-49 mins
41031-Bhzaklcezo OBS or IP - low complexity OR 25-34 mins
35800-Fbcnbaxawl OBS or IP - moderate complexity OR 35-49 mins
98946-Lctfcdncae OBS or IP - high complexity OR 50-79 mins
56019-Cwknxbdcbp OBS or IP - moderate complexity OR 35-49 mins
90160-Jkxehbvqse OBS or IP - moderate complexity OR 35-49 mins
44080-Slqdrxdhgd OBS or IP - high complexity OR 50-79 mins
72035-Lzsbgopqem OBS or IP - moderate complexity OR 35-49 mins

## 2024-09-19 NOTE — BH INPATIENT PSYCHIATRY DISCHARGE NOTE - OTHER PAST PSYCHIATRIC HISTORY (INCLUDE DETAILS REGARDING ONSET, COURSE OF ILLNESS, INPATIENT/OUTPATIENT TREATMENT)
**PT WAS ADMITTED TO ED AT [facility] ON 24 UNDER MR # [id number].  Portion of psychosocial was copied from documentation while pt was in ED**    48 yo female, mostly Mandrin speaking but can also speak English, unknown PMH, PPH of schizophrenia (per Jaspal, last seen at [facility] 3/8/24, inpt at [facility] 3/21. Recently taking Depakote) BIB EMS after reportedly becoming agitated aggressive at home with  (Contact info for collateral info not available). Per EMS report, pt was throwing things at .       When initially evaluated in ED, pt refused to answer questions, and then began licking underwear and menstruation pad.   Was able to be redirected to put on gown and was then tearful and crying uncontrollably. ED felt pt was unsafe and gave Zyprexa 10mg im x1.    during course of ED stay below was recorded:  AOx2 to self and location. Mental status exam was attempted and patient refused to participate. Patient answers "I don't know" to every question and couldn't give an explanation for EMS being called despite prompting. She endorses having been hospitalized for psychiatric reasons before, she doesn't know the names of medications she was taking, who her psychiatrist was. When asked to recall medications, she responds that she doesn't want to take any medications at all. Denies past violence. Denies hitting  or anybody, denies HI, and denies SI. Patient reported she lives in apartment with her . She works as an Home Health Aide.    Regarding collateral:  Pt first said "I don't remember" the number for . After a few minutes she provided this number [phone] for . The number was disconnected, and two other numbers from  were attempted, one disconnected and one VM left for Dr. Nunez at [phone]. Patient is an unreliable historian and has been demonstrating erratic behaviors during ED course. she's demonstrating impulsivity and had one attempt of pacing which was redirected by multiple staff. Pt also began picking her nose, darting eyes around for a place/person to fling.  1) [phone] pt reported for  - disconnected  2) [phone] from [employer] - "number you've dialed is not answering. please try again later"  3) [phone] from [employer] - Dr. Nunez - ED worker left a voicemail    Patient confirms visual hallucinations of devils, which she was dismissive of that being a problem. she feels healed by Rastafari figure, 2 years ago, and that she was sleeping better and no longer has neck deformity, all better now. she said she does hear things that other people don't hear, she said she can ignore them and they don't tell her to hurt people or herself. Denies command hallucinations. Denies anxiety/paranoia. Denies all depression/jazz symptoms.  Denies SIIP/HIIP. She's refusing meds because she feels healed by a Rastafari figure, 2 years ago, and that she was sleeping better and no longer has neck deformity, all better now so she doesn't need meds and is upset that they sedated her IM multiple times. Patient has poor insight into her behaviors.   Her family members are in [place], her parents are  and she has 3 brothers and two sisters. Patient states that her education level was some high school. She was able to correctly divide 100 by 7=14. Patient was assessed to be AOx2, disoriented to situation and time. Despite prompting, she was unable to recall the events that transpired prior to her admittance, but is able to recall when she received IM Zyprexa    While on [facility]:  pt was calm and cooperative, linear, focused on discharge, endorsing AVH. Pt states that she does not know why she is here. She feels "calm" and wants to go home. She denies any disorganized or agitated behavior while she was in the ED. She denies any past psychiatric history and states she does not take any medications at home. She denies any medical issues at this time. She does endorse some AVH, states she sees devils and demons and her  twin sister, but she is not afraid of them because is a Sabianist and Abraham will protect her. When asked about CAH, she states that she would not listen to any of them, but declines to answer if the voices have commanded her to do anything. She denies SIIP and HIIP.    This writer will attempt to obtain collateral once pt is clearer and is less disorganized.

## 2024-09-19 NOTE — BH INPATIENT PSYCHIATRY DISCHARGE NOTE - HOSPITAL COURSE
Patient was BIB EMS to the ED after becoming aggressive at home (throwing things at her ). She required IM medications in the ED. She endorsed perceptual disturbances (AH and VH) on admission, as well as reported some content concerning for delusions. She reported distress from her hallucinations. Patient was titrated to Invega 6mg with good effect and tolerability. She continued to report some VH of seeing angels by the time of discharge, but was not distressed by this. She was organized and well related by time of discharge, able to attend to ADLs, and denied SI and HI. She did not have behavioral issues or need for restraints/IM medications on the unit. She declined MARTINEZ option for Invega. She was discharged home with  and connected with Mandarin speaking outpatient psychiatrist.

## 2024-09-19 NOTE — BH INPATIENT PSYCHIATRY PROGRESS NOTE - NSBHMSEGAIT_PSY_A_CORE
Normal gait / station
Unable to assess
Normal gait / station
Unable to assess

## 2024-09-19 NOTE — BH DISCHARGE NOTE NURSING/SOCIAL WORK/PSYCH REHAB - NSDCPRRECOMMEND_PSY_ALL_CORE
Patient is recommended to continue their aftercare with [facility].

## 2024-09-19 NOTE — BH INPATIENT PSYCHIATRY DISCHARGE NOTE - ATTENDING DISCHARGE PHYSICAL EXAMINATION:
Appearance: grooming and hygiene intact, well appearing, casually dressed  Behavior: cooperative, pleasant, well-related, no PMA/PMR   Speech: regular rate, rhythm and volume   Mood: even, excited  Affect: euthymic, full range  Thought process: linear, logical, organized   Thought content: no delusions elicited   Perceptions: denies AH, ?VH of lucy  Insight: fair  Judgement: fair  Cognition: grossly intact   Gait: intact

## 2024-09-19 NOTE — BH INPATIENT PSYCHIATRY PROGRESS NOTE - NSBHMSESPEECH_PSY_A_CORE
Normal volume, rate, productivity, spontaneity and articulation
Abnormal as indicated, otherwise normal...
Abnormal as indicated, otherwise normal...
Normal volume, rate, productivity, spontaneity and articulation

## 2024-09-19 NOTE — BH INPATIENT PSYCHIATRY DISCHARGE NOTE - NSDCCPCAREPLAN_GEN_ALL_CORE_FT
PRINCIPAL DISCHARGE DIAGNOSIS  Diagnosis: Chronic schizoaffective disorder with acute exacerbation  Assessment and Plan of Treatment:

## 2024-09-19 NOTE — BH DISCHARGE NOTE NURSING/SOCIAL WORK/PSYCH REHAB - NSDCPRGOAL_PSY_ALL_CORE
Writer met with patient to discuss their final progress towards their goal. Patient ecstatic, engaged and dressed appropriately during the meeting. Upon patients discharge, patient has shown minimal progress towards their goal, due to lack of insight. Overall patient has attended a minimal amount of Psychiatric Rehabilitation groups and participated with encouragement. Patient has completed a safety plan before their discharge.

## 2024-09-19 NOTE — BH INPATIENT PSYCHIATRY PROGRESS NOTE - NSBHMETABOLIC_PSY_ALL_CORE_FT
BMI: BMI (kg/m2): 21.5 (09-11-24 @ 17:45)  HbA1c:   Glucose:   BP: 122/81 (09-12-24 @ 07:35) (122/81 - 122/81)Vital Signs Last 24 Hrs  T(C): 36.8 (09-14-24 @ 07:39), Max: 36.8 (09-14-24 @ 07:39)  T(F): 98.2 (09-14-24 @ 07:39), Max: 98.2 (09-14-24 @ 07:39)  HR: --  BP: --  BP(mean): --  RR: --  SpO2: --    Orthostatic VS  09-14-24 @ 07:39  Lying BP: --/-- HR: --  Sitting BP: 130/80 HR: 78  Standing BP: 109/74 HR: 108  Site: --  Mode: --  Orthostatic VS  09-13-24 @ 06:37  Lying BP: --/-- HR: --  Sitting BP: 128/82 HR: 85  Standing BP: 125/83 HR: 94  Site: --  Mode: --    Lipid Panel: Date/Time: 09-12-24 @ 10:48  Cholesterol, Serum: 204  LDL Cholesterol Calculated: 130  HDL Cholesterol, Serum: 61  Total Cholesterol/HDL Ration Measurement: --  Triglycerides, Serum: 64  
BMI: BMI (kg/m2): 21.5 (09-11-24 @ 17:45)  HbA1c:   Glucose:   BP: 122/81 (09-12-24 @ 07:35) (122/81 - 122/81)Vital Signs Last 24 Hrs  T(C): 36.8 (09-14-24 @ 07:39), Max: 36.8 (09-14-24 @ 07:39)  T(F): 98.2 (09-14-24 @ 07:39), Max: 98.2 (09-14-24 @ 07:39)  HR: --  BP: --  BP(mean): --  RR: --  SpO2: --    Orthostatic VS  09-14-24 @ 07:39  Lying BP: --/-- HR: --  Sitting BP: 130/80 HR: 78  Standing BP: 109/74 HR: 108  Site: --  Mode: --  Orthostatic VS  09-13-24 @ 06:37  Lying BP: --/-- HR: --  Sitting BP: 128/82 HR: 85  Standing BP: 125/83 HR: 94  Site: --  Mode: --    Lipid Panel: Date/Time: 09-12-24 @ 10:48  Cholesterol, Serum: 204  LDL Cholesterol Calculated: 130  HDL Cholesterol, Serum: 61  Total Cholesterol/HDL Ration Measurement: --  Triglycerides, Serum: 64  
BMI: BMI (kg/m2): 21.5 (09-11-24 @ 17:45)  HbA1c:   Glucose:   BP: 111/68 (09-18-24 @ 07:51) (111/68 - 111/68)Vital Signs Last 24 Hrs  T(C): 36.3 (09-19-24 @ 07:25), Max: 36.3 (09-19-24 @ 07:25)  T(F): 97.3 (09-19-24 @ 07:25), Max: 97.3 (09-19-24 @ 07:25)  HR: --  BP: --  BP(mean): --  RR: --  SpO2: --    Orthostatic VS  09-19-24 @ 07:25  Lying BP: --/-- HR: --  Sitting BP: 103/66 HR: 72  Standing BP: --/-- HR: --  Site: --  Mode: --    Lipid Panel: Date/Time: 09-12-24 @ 10:48  Cholesterol, Serum: 204  LDL Cholesterol Calculated: 130  HDL Cholesterol, Serum: 61  Total Cholesterol/HDL Ration Measurement: --  Triglycerides, Serum: 64  
BMI: BMI (kg/m2): 21.5 (09-11-24 @ 17:45)  HbA1c:   Glucose:   BP: 144/84 (09-16-24 @ 08:05) (136/88 - 144/84)Vital Signs Last 24 Hrs  T(C): 36.9 (09-17-24 @ 07:44), Max: 36.9 (09-17-24 @ 07:44)  T(F): 98.5 (09-17-24 @ 07:44), Max: 98.5 (09-17-24 @ 07:44)  HR: --  BP: --  BP(mean): --  RR: --  SpO2: --    Orthostatic VS  09-17-24 @ 07:44  Lying BP: --/-- HR: --  Sitting BP: 125/75 HR: 85  Standing BP: 135/65 HR: 80  Site: --  Mode: --    Lipid Panel: Date/Time: 09-12-24 @ 10:48  Cholesterol, Serum: 204  LDL Cholesterol Calculated: 130  HDL Cholesterol, Serum: 61  Total Cholesterol/HDL Ration Measurement: --  Triglycerides, Serum: 64  
BMI: BMI (kg/m2): 21.5 (09-11-24 @ 17:45)  HbA1c:   Glucose:   BP: 122/81 (09-12-24 @ 07:35) (122/81 - 122/81)Vital Signs Last 24 Hrs  T(C): 36.7 (09-12-24 @ 07:35), Max: 36.7 (09-12-24 @ 07:35)  T(F): 98.1 (09-12-24 @ 07:35), Max: 98.1 (09-12-24 @ 07:35)  HR: --  BP: 122/81 (09-12-24 @ 07:35) (122/81 - 122/81)  BP(mean): 68 (09-12-24 @ 07:35) (68 - 68)  RR: --  SpO2: --    Orthostatic VS  09-11-24 @ 17:45  Lying BP: --/-- HR: --  Sitting BP: 141/88 HR: 96  Standing BP: 135/89 HR: 101  Site: --  Mode: --    Lipid Panel: Date/Time: 09-12-24 @ 10:48  Cholesterol, Serum: 204  LDL Cholesterol Calculated: 130  HDL Cholesterol, Serum: 61  Total Cholesterol/HDL Ration Measurement: --  Triglycerides, Serum: 64  
BMI: BMI (kg/m2): 21.5 (09-11-24 @ 17:45)  HbA1c:   Glucose:   BP: 136/88 (09-15-24 @ 08:12) (136/88 - 136/88)Vital Signs Last 24 Hrs  T(C): 36.6 (09-15-24 @ 08:12), Max: 36.6 (09-15-24 @ 08:12)  T(F): 97.9 (09-15-24 @ 08:12), Max: 97.9 (09-15-24 @ 08:12)  HR: --  BP: 136/88 (09-15-24 @ 08:12) (136/88 - 136/88)  BP(mean): 80 (09-15-24 @ 08:12) (80 - 80)  RR: --  SpO2: --    Orthostatic VS  09-14-24 @ 07:39  Lying BP: --/-- HR: --  Sitting BP: 130/80 HR: 78  Standing BP: 109/74 HR: 108  Site: --  Mode: --    Lipid Panel: Date/Time: 09-12-24 @ 10:48  Cholesterol, Serum: 204  LDL Cholesterol Calculated: 130  HDL Cholesterol, Serum: 61  Total Cholesterol/HDL Ration Measurement: --  Triglycerides, Serum: 64  
BMI: BMI (kg/m2): 21.5 (09-11-24 @ 17:45)  HbA1c:   Glucose:   BP: 144/84 (09-16-24 @ 08:05) (136/88 - 144/84)Vital Signs Last 24 Hrs  T(C): 36.3 (09-16-24 @ 08:05), Max: 36.3 (09-16-24 @ 08:05)  T(F): 97.3 (09-16-24 @ 08:05), Max: 97.3 (09-16-24 @ 08:05)  HR: 60 (09-16-24 @ 08:05) (60 - 60)  BP: 144/84 (09-16-24 @ 08:05) (144/84 - 144/84)  BP(mean): --  RR: --  SpO2: --      Lipid Panel: Date/Time: 09-12-24 @ 10:48  Cholesterol, Serum: 204  LDL Cholesterol Calculated: 130  HDL Cholesterol, Serum: 61  Total Cholesterol/HDL Ration Measurement: --  Triglycerides, Serum: 64  
BMI: BMI (kg/m2): 21.5 (09-11-24 @ 17:45)  HbA1c:   Glucose:   BP: 111/68 (09-18-24 @ 07:51) (111/68 - 144/84)Vital Signs Last 24 Hrs  T(C): 36.7 (09-18-24 @ 07:51), Max: 36.7 (09-18-24 @ 07:51)  T(F): 98 (09-18-24 @ 07:51), Max: 98 (09-18-24 @ 07:51)  HR: 85 (09-18-24 @ 07:51) (85 - 85)  BP: 111/68 (09-18-24 @ 07:51) (111/68 - 111/68)  BP(mean): --  RR: --  SpO2: --    Orthostatic VS  09-17-24 @ 07:44  Lying BP: --/-- HR: --  Sitting BP: 125/75 HR: 85  Standing BP: 135/65 HR: 80  Site: --  Mode: --    Lipid Panel: Date/Time: 09-12-24 @ 10:48  Cholesterol, Serum: 204  LDL Cholesterol Calculated: 130  HDL Cholesterol, Serum: 61  Total Cholesterol/HDL Ration Measurement: --  Triglycerides, Serum: 64

## 2024-09-19 NOTE — BH INPATIENT PSYCHIATRY PROGRESS NOTE - NSBHPSYCHOLCOGORIENT_PSY_A_CORE
Oriented to time, place, person, situation
Unable to assess
Not fully oriented...
Oriented to time, place, person, situation

## 2024-09-19 NOTE — BH INPATIENT PSYCHIATRY PROGRESS NOTE - NSBHMSEMOOD_PSY_A_CORE
Unable to assess
Other
Normal/Euphoria/Other
Normal/Euphoria/Other
Normal/Other
Normal/Euphoria/Other
Normal/Unable to assess
Other

## 2024-09-19 NOTE — BH DISCHARGE NOTE NURSING/SOCIAL WORK/PSYCH REHAB - DISCHARGE INSTRUCTIONS AFTERCARE APPOINTMENTS
In order to check the location, date, or time of your aftercare appointment, please refer to your Discharge Instructions Document given to you upon leaving the hospital.  If you have lost the instructions please call [phone]

## 2024-09-19 NOTE — BH INPATIENT PSYCHIATRY DISCHARGE NOTE - NSBHASSESSSUMMFT_PSY_ALL_CORE
50 yo female, Mandrin speaking, unknown PMH, PPH of schizophrenia per Psyckes with several admissions (last one listed at [facility] 2/24), recently taking Depakote per Psyckes BIB EMS after reportedly becoming agitated aggressive at home with  (Contact info for collateral info not available).   Per EMS report, pt was throwing things at .  Pt was seen in the [facility] ED, and deemed to require psychiatric admission per C/L. Pt was disorganized, in thoughts and behaviors- was licking menstrual blood of maxi pad; pt was unable to provide correct phone number for collateral. Here on the unit, patient presents as calm and cooperative, she is still expressing some paranoid delusions and endorsing AVH. Given history of prior psych admissions, disorganized behavior in ED that required IM medications, and agitation at home (i.e., throwing things at ), patient's presentation is consistent with psychosis and requires psychiatric admission for stabilization. Given history of prior psych admissions, disorganized behavior in ED that required IM medications, and agitation at home (i.e., throwing things at ), patient's presentation is consistent with psychosis and requires psychiatric admission for stabilization.     On day of discharge, patient is well appearing, reporting resolution of AH and only some visual disturbances of "seeing angels" that has been ongoing. Reality-based in her desire to get home to . Reviewed plan for medications and follow-up. Patient expresses understanding.    Plan:  -D/c today, to be picked up by sabrina  -Continue Invega 6mg QHS  -F/u appt at [facility]

## 2024-09-19 NOTE — BH INPATIENT PSYCHIATRY PROGRESS NOTE - NSTXANXINTERMD_PSY_ALL_CORE
psychopharm with possible MARTINEZ and supportive therapy with aftercare referral

## 2024-09-20 VITALS — TEMPERATURE: 98 F | SYSTOLIC BLOOD PRESSURE: 106 MMHG | HEART RATE: 76 BPM | DIASTOLIC BLOOD PRESSURE: 69 MMHG

## 2024-09-20 NOTE — BH PSYCHOLOGY - GROUP THERAPY NOTE - NSBHPSYCHOLPARTICIPCOMMENT_PSY_A_CORE FT
Pt. engaged appropriately in group. Pt. was alert and oriented throughout group for the most part, though she did leave and come back. During check-in, pt. "passed". Pt. participated in the "Weekend Values" exercise and discussion. Pt. listened as others shared their experiences. 
Pt. engaged appropriately in group. Pt. was alert and oriented throughout group. During check-in, pt. shared one smell that reminds her of a positive memory is "perfume". Pt. participated in the Drawing Acceptance activity and discussion. Pt. listened as others shared their experiences.

## 2024-09-20 NOTE — BH PSYCHOLOGY - GROUP THERAPY NOTE - TOKEN PULL-DIAGNOSIS
Primary Diagnosis:  Schizophrenia [F20.9]        Problem Dx:   
Primary Diagnosis:  Schizophrenia [F20.9]        Problem Dx:

## 2024-09-20 NOTE — BH PSYCHOLOGY - GROUP THERAPY NOTE - NSPSYCHOLGRPCOGPT_PSY_A_CORE FT
Patient attended recovery oriented/acceptance & commitment therapy group. The group started with the brief check in question: "What is one smell that reminds you of a positive memory?". The group then focused on topics of acceptance, cognitive defusion, and compassion. Patients discussed the topic of acceptance from a previous group.  facilitated an exercise to practice experiencing acceptance called "Drawing Acceptance". Pts began a drawing and then passed their paper when the  said "pass". The process continued until each group member had their original paper. Pts. shared examples of how they felt having to let go of their drawing, as well as what it was like to receive their drawing at the end with other members' contributions.  facilitated discussion of concepts, encouraged active participation, and supported members providing feedback to peers. 
Patient attended recovery oriented/acceptance & commitment therapy group. The group started with the brief check in question: "What is one piece of advice that has stuck with you?". The group then focused on topics of values, cognitive defusion, and compassion. Patients picked 3 values from a list of values, then chose 1 of the 3 to focus on during the weekend. Pts wrote the value on one side of an index card, and 3 behaviors they could take on the unit in line with their value on the back of their index card. Pts. shared examples of what they wrote down.  facilitated discussion of concepts, encouraged active participation, and supported members providing feedback to peers.

## 2024-09-21 LAB — ACETAZOLAMIDE LEVEL: <0.5 CD:431152031 — LOW

## 2024-09-27 ENCOUNTER — OUTPATIENT (OUTPATIENT)
Dept: OUTPATIENT SERVICES | Facility: HOSPITAL | Age: 49
LOS: 1 days | Discharge: ANOTHER HOSPITAL | End: 2024-09-27
Payer: COMMERCIAL

## 2024-09-27 PROBLEM — Z78.9 OTHER SPECIFIED HEALTH STATUS: Chronic | Status: ACTIVE | Noted: 2024-09-11

## 2024-09-27 PROCEDURE — 90839 PSYTX CRISIS INITIAL 60 MIN: CPT

## 2024-10-02 DIAGNOSIS — F20.9 SCHIZOPHRENIA, UNSPECIFIED: ICD-10-CM

## 2024-10-17 NOTE — ED PROVIDER NOTE - PHYSICAL EXAMINATION
General: well appearing, no acute distress, appropriate weight. mandarin speaking  HENT:  Head: normocephalic, atraumatic.   Eyes: no nystagmus  Cardiac: unable to assess  Pulm: unable to assess  GI: unable to assess  Neuro: unable to assess  Psych: patient with poor eye contact. refusing to speak 7

## 2025-08-31 ENCOUNTER — EMERGENCY (EMERGENCY)
Facility: HOSPITAL | Age: 50
LOS: 1 days | End: 2025-08-31
Attending: STUDENT IN AN ORGANIZED HEALTH CARE EDUCATION/TRAINING PROGRAM
Payer: COMMERCIAL

## 2025-08-31 VITALS
OXYGEN SATURATION: 100 % | TEMPERATURE: 98 F | HEIGHT: 62 IN | RESPIRATION RATE: 16 BRPM | DIASTOLIC BLOOD PRESSURE: 93 MMHG | WEIGHT: 139.99 LBS | SYSTOLIC BLOOD PRESSURE: 132 MMHG | HEART RATE: 72 BPM

## 2025-08-31 LAB
ADD ON TEST-SPECIMEN IN LAB: SIGNIFICANT CHANGE UP
ALBUMIN SERPL ELPH-MCNC: 5.6 G/DL — HIGH (ref 3.3–5)
ALP SERPL-CCNC: 50 U/L — SIGNIFICANT CHANGE UP (ref 40–120)
ALT FLD-CCNC: 8 U/L — LOW (ref 10–45)
ANION GAP SERPL CALC-SCNC: 18 MMOL/L — HIGH (ref 5–17)
APAP SERPL-MCNC: <15 UG/ML — SIGNIFICANT CHANGE UP (ref 10–30)
AST SERPL-CCNC: 14 U/L — SIGNIFICANT CHANGE UP (ref 10–40)
BASOPHILS # BLD AUTO: 0.03 K/UL — SIGNIFICANT CHANGE UP (ref 0–0.2)
BASOPHILS NFR BLD AUTO: 0.5 % — SIGNIFICANT CHANGE UP (ref 0–2)
BILIRUB SERPL-MCNC: 0.9 MG/DL — SIGNIFICANT CHANGE UP (ref 0.2–1.2)
BUN SERPL-MCNC: 18 MG/DL — SIGNIFICANT CHANGE UP (ref 7–23)
CALCIUM SERPL-MCNC: 9.9 MG/DL — SIGNIFICANT CHANGE UP (ref 8.4–10.5)
CHLORIDE SERPL-SCNC: 105 MMOL/L — SIGNIFICANT CHANGE UP (ref 96–108)
CO2 SERPL-SCNC: 19 MMOL/L — LOW (ref 22–31)
CREAT SERPL-MCNC: 0.44 MG/DL — LOW (ref 0.5–1.3)
EGFR: 117 ML/MIN/1.73M2 — SIGNIFICANT CHANGE UP
EGFR: 117 ML/MIN/1.73M2 — SIGNIFICANT CHANGE UP
EOSINOPHIL # BLD AUTO: 0.01 K/UL — SIGNIFICANT CHANGE UP (ref 0–0.5)
EOSINOPHIL NFR BLD AUTO: 0.2 % — SIGNIFICANT CHANGE UP (ref 0–6)
ETHANOL SERPL-MCNC: <10 MG/DL — SIGNIFICANT CHANGE UP (ref 0–10)
FLUAV AG NPH QL: SIGNIFICANT CHANGE UP
FLUBV AG NPH QL: SIGNIFICANT CHANGE UP
GLUCOSE SERPL-MCNC: 128 MG/DL — HIGH (ref 70–99)
HCT VFR BLD CALC: 44.1 % — SIGNIFICANT CHANGE UP (ref 34.5–45)
HGB BLD-MCNC: 14.7 G/DL — SIGNIFICANT CHANGE UP (ref 11.5–15.5)
IMM GRANULOCYTES # BLD AUTO: 0.01 K/UL — SIGNIFICANT CHANGE UP (ref 0–0.07)
IMM GRANULOCYTES NFR BLD AUTO: 0.2 % — SIGNIFICANT CHANGE UP (ref 0–0.9)
LYMPHOCYTES # BLD AUTO: 1.45 K/UL — SIGNIFICANT CHANGE UP (ref 1–3.3)
LYMPHOCYTES NFR BLD AUTO: 25 % — SIGNIFICANT CHANGE UP (ref 13–44)
MCHC RBC-ENTMCNC: 30.5 PG — SIGNIFICANT CHANGE UP (ref 27–34)
MCHC RBC-ENTMCNC: 33.3 G/DL — SIGNIFICANT CHANGE UP (ref 32–36)
MCV RBC AUTO: 91.5 FL — SIGNIFICANT CHANGE UP (ref 80–100)
MONOCYTES # BLD AUTO: 0.45 K/UL — SIGNIFICANT CHANGE UP (ref 0–0.9)
MONOCYTES NFR BLD AUTO: 7.7 % — SIGNIFICANT CHANGE UP (ref 2–14)
NEUTROPHILS # BLD AUTO: 3.86 K/UL — SIGNIFICANT CHANGE UP (ref 1.8–7.4)
NEUTROPHILS NFR BLD AUTO: 66.4 % — SIGNIFICANT CHANGE UP (ref 43–77)
NRBC # BLD AUTO: 0 K/UL — SIGNIFICANT CHANGE UP (ref 0–0)
NRBC # FLD: 0 K/UL — SIGNIFICANT CHANGE UP (ref 0–0)
NRBC BLD AUTO-RTO: 0 /100 WBCS — SIGNIFICANT CHANGE UP (ref 0–0)
PLATELET # BLD AUTO: 214 K/UL — SIGNIFICANT CHANGE UP (ref 150–400)
PMV BLD: 10 FL — SIGNIFICANT CHANGE UP (ref 7–13)
POTASSIUM SERPL-MCNC: 3.9 MMOL/L — SIGNIFICANT CHANGE UP (ref 3.5–5.3)
POTASSIUM SERPL-SCNC: 3.9 MMOL/L — SIGNIFICANT CHANGE UP (ref 3.5–5.3)
PROT SERPL-MCNC: 8.2 G/DL — SIGNIFICANT CHANGE UP (ref 6–8.3)
RBC # BLD: 4.82 M/UL — SIGNIFICANT CHANGE UP (ref 3.8–5.2)
RBC # FLD: 12.1 % — SIGNIFICANT CHANGE UP (ref 10.3–14.5)
RSV RNA NPH QL NAA+NON-PROBE: SIGNIFICANT CHANGE UP
SALICYLATES SERPL-MCNC: <2 MG/DL — LOW (ref 15–30)
SARS-COV-2 RNA SPEC QL NAA+PROBE: SIGNIFICANT CHANGE UP
SODIUM SERPL-SCNC: 142 MMOL/L — SIGNIFICANT CHANGE UP (ref 135–145)
SOURCE RESPIRATORY: SIGNIFICANT CHANGE UP
VALPROATE SERPL-MCNC: 59 UG/ML — SIGNIFICANT CHANGE UP (ref 50–100)
WBC # BLD: 5.81 K/UL — SIGNIFICANT CHANGE UP (ref 3.8–10.5)
WBC # FLD AUTO: 5.81 K/UL — SIGNIFICANT CHANGE UP (ref 3.8–10.5)

## 2025-08-31 PROCEDURE — 84702 CHORIONIC GONADOTROPIN TEST: CPT

## 2025-08-31 PROCEDURE — 80053 COMPREHEN METABOLIC PANEL: CPT

## 2025-08-31 PROCEDURE — 99285 EMERGENCY DEPT VISIT HI MDM: CPT

## 2025-08-31 PROCEDURE — 93010 ELECTROCARDIOGRAM REPORT: CPT

## 2025-08-31 PROCEDURE — 80307 DRUG TEST PRSMV CHEM ANLYZR: CPT

## 2025-08-31 PROCEDURE — 85025 COMPLETE CBC W/AUTO DIFF WBC: CPT

## 2025-08-31 PROCEDURE — 71045 X-RAY EXAM CHEST 1 VIEW: CPT

## 2025-08-31 PROCEDURE — 80164 ASSAY DIPROPYLACETIC ACD TOT: CPT

## 2025-08-31 PROCEDURE — 71045 X-RAY EXAM CHEST 1 VIEW: CPT | Mod: 26

## 2025-08-31 PROCEDURE — 87637 SARSCOV2&INF A&B&RSV AMP PRB: CPT

## 2025-08-31 RX ORDER — ONDANSETRON HCL/PF 4 MG/2 ML
4 VIAL (ML) INJECTION ONCE
Refills: 0 | Status: COMPLETED | OUTPATIENT
Start: 2025-08-31 | End: 2025-08-31

## 2025-08-31 RX ORDER — OLANZAPINE 10 MG/1
10 TABLET ORAL ONCE
Refills: 0 | Status: DISCONTINUED | OUTPATIENT
Start: 2025-08-31 | End: 2025-08-31

## 2025-08-31 RX ORDER — OLANZAPINE 10 MG/1
5 TABLET ORAL ONCE
Refills: 0 | Status: COMPLETED | OUTPATIENT
Start: 2025-08-31 | End: 2025-08-31

## 2025-08-31 RX ADMIN — OLANZAPINE 5 MILLIGRAM(S): 10 TABLET ORAL at 23:57

## 2025-09-01 VITALS
TEMPERATURE: 98 F | HEART RATE: 91 BPM | SYSTOLIC BLOOD PRESSURE: 157 MMHG | OXYGEN SATURATION: 98 % | DIASTOLIC BLOOD PRESSURE: 91 MMHG | RESPIRATION RATE: 17 BRPM

## 2025-09-01 PROCEDURE — 87637 SARSCOV2&INF A&B&RSV AMP PRB: CPT

## 2025-09-01 PROCEDURE — 99285 EMERGENCY DEPT VISIT HI MDM: CPT | Mod: 25

## 2025-09-01 PROCEDURE — 80164 ASSAY DIPROPYLACETIC ACD TOT: CPT

## 2025-09-01 PROCEDURE — 93005 ELECTROCARDIOGRAM TRACING: CPT

## 2025-09-01 PROCEDURE — 70450 CT HEAD/BRAIN W/O DYE: CPT | Mod: 26

## 2025-09-01 PROCEDURE — 71045 X-RAY EXAM CHEST 1 VIEW: CPT

## 2025-09-01 PROCEDURE — 80053 COMPREHEN METABOLIC PANEL: CPT

## 2025-09-01 PROCEDURE — 70450 CT HEAD/BRAIN W/O DYE: CPT

## 2025-09-01 PROCEDURE — 80307 DRUG TEST PRSMV CHEM ANLYZR: CPT

## 2025-09-01 PROCEDURE — 84702 CHORIONIC GONADOTROPIN TEST: CPT

## 2025-09-01 PROCEDURE — 90792 PSYCH DIAG EVAL W/MED SRVCS: CPT | Mod: 95

## 2025-09-01 PROCEDURE — 85025 COMPLETE CBC W/AUTO DIFF WBC: CPT

## 2025-09-01 RX ADMIN — Medication 4 MILLIGRAM(S): at 00:09
